# Patient Record
Sex: FEMALE | Race: WHITE | NOT HISPANIC OR LATINO | Employment: FULL TIME | URBAN - METROPOLITAN AREA
[De-identification: names, ages, dates, MRNs, and addresses within clinical notes are randomized per-mention and may not be internally consistent; named-entity substitution may affect disease eponyms.]

---

## 2023-03-27 RX ORDER — ROPINIROLE 1 MG/1
1 TABLET, FILM COATED ORAL NIGHTLY
COMMUNITY
Start: 2019-07-02 | End: 2023-06-20 | Stop reason: SDUPTHER

## 2023-03-27 RX ORDER — GLIMEPIRIDE 4 MG/1
1 TABLET ORAL DAILY
COMMUNITY
Start: 2018-12-27 | End: 2023-06-20 | Stop reason: SDUPTHER

## 2023-03-27 RX ORDER — LISINOPRIL 5 MG/1
1 TABLET ORAL DAILY
COMMUNITY
Start: 2017-11-10 | End: 2023-06-20 | Stop reason: SDUPTHER

## 2023-03-27 RX ORDER — ATORVASTATIN CALCIUM 20 MG/1
1 TABLET, FILM COATED ORAL DAILY
COMMUNITY
Start: 2017-11-10 | End: 2023-06-20 | Stop reason: SDUPTHER

## 2023-03-27 RX ORDER — LANOLIN ALCOHOL/MO/W.PET/CERES
CREAM (GRAM) TOPICAL
COMMUNITY

## 2023-03-27 RX ORDER — MELOXICAM 15 MG/1
1 TABLET ORAL DAILY
COMMUNITY
Start: 2022-10-03 | End: 2023-06-07

## 2023-03-27 RX ORDER — LEVOTHYROXINE SODIUM 100 UG/1
1 TABLET ORAL DAILY
COMMUNITY
Start: 2018-07-06 | End: 2023-06-20 | Stop reason: SDUPTHER

## 2023-03-27 RX ORDER — METFORMIN HYDROCHLORIDE 1000 MG/1
1 TABLET ORAL 2 TIMES DAILY
COMMUNITY
Start: 2018-07-06 | End: 2023-06-20 | Stop reason: SDUPTHER

## 2023-03-29 DIAGNOSIS — E11.69 TYPE 2 DIABETES MELLITUS WITH OTHER SPECIFIED COMPLICATION, UNSPECIFIED WHETHER LONG TERM INSULIN USE (MULTI): Primary | ICD-10-CM

## 2023-06-05 DIAGNOSIS — G56.01 ACUTE CARPAL TUNNEL SYNDROME, RIGHT: ICD-10-CM

## 2023-06-06 PROBLEM — G56.01 ACUTE CARPAL TUNNEL SYNDROME, RIGHT: Status: ACTIVE | Noted: 2023-06-06

## 2023-06-07 RX ORDER — MELOXICAM 15 MG/1
TABLET ORAL
Qty: 30 TABLET | Refills: 0 | Status: SHIPPED | OUTPATIENT
Start: 2023-06-07 | End: 2023-06-20 | Stop reason: SDUPTHER

## 2023-06-20 ENCOUNTER — TELEMEDICINE (OUTPATIENT)
Dept: PHARMACY | Facility: HOSPITAL | Age: 68
End: 2023-06-20
Payer: MEDICARE

## 2023-06-20 ENCOUNTER — OFFICE VISIT (OUTPATIENT)
Dept: PRIMARY CARE | Facility: CLINIC | Age: 68
End: 2023-06-20
Payer: MEDICARE

## 2023-06-20 VITALS
WEIGHT: 224 LBS | HEART RATE: 65 BPM | RESPIRATION RATE: 18 BRPM | DIASTOLIC BLOOD PRESSURE: 72 MMHG | SYSTOLIC BLOOD PRESSURE: 114 MMHG | HEIGHT: 69 IN | TEMPERATURE: 97 F | OXYGEN SATURATION: 96 % | BODY MASS INDEX: 33.18 KG/M2

## 2023-06-20 DIAGNOSIS — E55.9 VITAMIN D DEFICIENCY, UNSPECIFIED: ICD-10-CM

## 2023-06-20 DIAGNOSIS — I10 HYPERTENSION, UNSPECIFIED TYPE: ICD-10-CM

## 2023-06-20 DIAGNOSIS — G25.81 RESTLESS LEGS SYNDROME: Primary | ICD-10-CM

## 2023-06-20 DIAGNOSIS — R79.89 LOW VITAMIN B12 LEVEL: ICD-10-CM

## 2023-06-20 DIAGNOSIS — G56.01 ACUTE CARPAL TUNNEL SYNDROME, RIGHT: ICD-10-CM

## 2023-06-20 DIAGNOSIS — E03.9 HYPOTHYROIDISM, UNSPECIFIED TYPE: ICD-10-CM

## 2023-06-20 DIAGNOSIS — E11.69 TYPE 2 DIABETES MELLITUS WITH OTHER SPECIFIED COMPLICATION, UNSPECIFIED WHETHER LONG TERM INSULIN USE (MULTI): ICD-10-CM

## 2023-06-20 DIAGNOSIS — Z12.31 BREAST CANCER SCREENING BY MAMMOGRAM: ICD-10-CM

## 2023-06-20 DIAGNOSIS — M21.42 FALLEN ARCHES: ICD-10-CM

## 2023-06-20 DIAGNOSIS — M21.41 FALLEN ARCHES: ICD-10-CM

## 2023-06-20 PROBLEM — H90.3 BILATERAL SENSORINEURAL HEARING LOSS: Status: ACTIVE | Noted: 2023-06-20

## 2023-06-20 PROBLEM — D72.820 ATYPICAL LYMPHOCYTOSIS: Status: ACTIVE | Noted: 2023-06-20

## 2023-06-20 PROBLEM — H60.90 OTITIS EXTERNA: Status: ACTIVE | Noted: 2023-06-20

## 2023-06-20 PROBLEM — U07.1 DISEASE DUE TO SEVERE ACUTE RESPIRATORY SYNDROME CORONAVIRUS 2 (SARS-COV-2): Status: ACTIVE | Noted: 2023-06-20

## 2023-06-20 PROBLEM — M85.80 OSTEOPENIA: Status: ACTIVE | Noted: 2023-06-20

## 2023-06-20 PROBLEM — R10.9 RIGHT FLANK PAIN: Status: ACTIVE | Noted: 2023-06-20

## 2023-06-20 PROBLEM — J01.90 ACUTE BACTERIAL SINUSITIS: Status: ACTIVE | Noted: 2023-06-20

## 2023-06-20 PROBLEM — B96.89 ACUTE BACTERIAL SINUSITIS: Status: ACTIVE | Noted: 2023-06-20

## 2023-06-20 PROBLEM — R50.9 FUO (FEVER OF UNKNOWN ORIGIN): Status: ACTIVE | Noted: 2023-06-20

## 2023-06-20 PROBLEM — I20.89 ATYPICAL ANGINA (CMS-HCC): Status: ACTIVE | Noted: 2023-06-20

## 2023-06-20 PROBLEM — R94.31 ABNORMAL EKG: Status: ACTIVE | Noted: 2023-06-20

## 2023-06-20 PROBLEM — H60.311 DIFFUSE OTITIS EXTERNA OF RIGHT EAR: Status: ACTIVE | Noted: 2019-12-12

## 2023-06-20 PROBLEM — E11.9 DIABETES MELLITUS (MULTI): Status: ACTIVE | Noted: 2023-06-20

## 2023-06-20 PROBLEM — R53.83 FATIGUE: Status: ACTIVE | Noted: 2023-06-20

## 2023-06-20 PROBLEM — R50.9 FEVER AND CHILLS: Status: ACTIVE | Noted: 2023-06-20

## 2023-06-20 PROBLEM — H61.21 IMPACTED CERUMEN, RIGHT EAR: Status: ACTIVE | Noted: 2019-12-12

## 2023-06-20 PROBLEM — J02.9 SORE THROAT: Status: ACTIVE | Noted: 2023-06-20

## 2023-06-20 PROBLEM — D64.9 ANEMIA: Status: ACTIVE | Noted: 2023-06-20

## 2023-06-20 PROBLEM — E78.5 HYPERLIPIDEMIA: Status: ACTIVE | Noted: 2023-06-20

## 2023-06-20 PROBLEM — M25.551 HIP PAIN, RIGHT: Status: ACTIVE | Noted: 2023-06-20

## 2023-06-20 PROBLEM — M25.562 KNEE PAIN, LEFT: Status: ACTIVE | Noted: 2023-06-20

## 2023-06-20 PROBLEM — R71.8 ELEVATED MCV: Status: ACTIVE | Noted: 2023-06-20

## 2023-06-20 PROBLEM — F41.9 ANXIETY: Status: ACTIVE | Noted: 2023-06-20

## 2023-06-20 PROBLEM — E78.00 HYPERCHOLESTEROLEMIA: Status: ACTIVE | Noted: 2023-06-20

## 2023-06-20 PROBLEM — E66.9 OBESITY (BMI 30-39.9): Status: ACTIVE | Noted: 2023-06-20

## 2023-06-20 PROBLEM — R31.9 HEMATURIA: Status: ACTIVE | Noted: 2023-06-20

## 2023-06-20 PROBLEM — G47.30 SLEEP APNEA: Status: ACTIVE | Noted: 2023-06-20

## 2023-06-20 PROBLEM — G43.909 MIGRAINE HEADACHE: Status: ACTIVE | Noted: 2023-06-20

## 2023-06-20 PROBLEM — J30.9 ALLERGIC RHINITIS: Status: ACTIVE | Noted: 2023-06-20

## 2023-06-20 PROBLEM — R31.21 ASYMPTOMATIC MICROSCOPIC HEMATURIA: Status: ACTIVE | Noted: 2023-06-20

## 2023-06-20 LAB — POC HEMOGLOBIN A1C: 7.6 % (ref 4.2–6.5)

## 2023-06-20 PROCEDURE — 3074F SYST BP LT 130 MM HG: CPT | Performed by: FAMILY MEDICINE

## 2023-06-20 PROCEDURE — 1036F TOBACCO NON-USER: CPT | Performed by: FAMILY MEDICINE

## 2023-06-20 PROCEDURE — 3078F DIAST BP <80 MM HG: CPT | Performed by: FAMILY MEDICINE

## 2023-06-20 PROCEDURE — 83036 HEMOGLOBIN GLYCOSYLATED A1C: CPT | Performed by: FAMILY MEDICINE

## 2023-06-20 PROCEDURE — 99214 OFFICE O/P EST MOD 30 MIN: CPT | Performed by: FAMILY MEDICINE

## 2023-06-20 PROCEDURE — 1159F MED LIST DOCD IN RCRD: CPT | Performed by: FAMILY MEDICINE

## 2023-06-20 PROCEDURE — 1160F RVW MEDS BY RX/DR IN RCRD: CPT | Performed by: FAMILY MEDICINE

## 2023-06-20 PROCEDURE — 4010F ACE/ARB THERAPY RXD/TAKEN: CPT | Performed by: FAMILY MEDICINE

## 2023-06-20 RX ORDER — ATORVASTATIN CALCIUM 20 MG/1
20 TABLET, FILM COATED ORAL DAILY
Qty: 90 TABLET | Refills: 3 | Status: SHIPPED | OUTPATIENT
Start: 2023-06-20 | End: 2023-12-05 | Stop reason: SDUPTHER

## 2023-06-20 RX ORDER — SEMAGLUTIDE 1.34 MG/ML
0.25 INJECTION, SOLUTION SUBCUTANEOUS
Qty: 4.5 G | Refills: 11 | Status: SHIPPED | OUTPATIENT
Start: 2023-06-20 | End: 2023-08-28 | Stop reason: SDUPTHER

## 2023-06-20 RX ORDER — LISINOPRIL 5 MG/1
5 TABLET ORAL DAILY
Qty: 90 TABLET | Refills: 3 | Status: SHIPPED | OUTPATIENT
Start: 2023-06-20 | End: 2023-09-25 | Stop reason: SINTOL

## 2023-06-20 RX ORDER — MELOXICAM 15 MG/1
15 TABLET ORAL DAILY
Qty: 90 TABLET | Refills: 3 | Status: SHIPPED | OUTPATIENT
Start: 2023-06-20 | End: 2023-12-05 | Stop reason: SDUPTHER

## 2023-06-20 RX ORDER — METFORMIN HYDROCHLORIDE 1000 MG/1
1000 TABLET ORAL 2 TIMES DAILY
Qty: 180 TABLET | Refills: 3 | Status: SHIPPED | OUTPATIENT
Start: 2023-06-20 | End: 2023-12-05 | Stop reason: SDUPTHER

## 2023-06-20 RX ORDER — GLIMEPIRIDE 4 MG/1
4 TABLET ORAL DAILY
Qty: 90 TABLET | Refills: 3 | Status: SHIPPED | OUTPATIENT
Start: 2023-06-20 | End: 2023-09-25 | Stop reason: ALTCHOICE

## 2023-06-20 RX ORDER — LEVOTHYROXINE SODIUM 100 UG/1
100 TABLET ORAL DAILY
Qty: 90 TABLET | Refills: 3 | Status: SHIPPED | OUTPATIENT
Start: 2023-06-20 | End: 2023-12-05 | Stop reason: SDUPTHER

## 2023-06-20 RX ORDER — ROPINIROLE 1 MG/1
1 TABLET, FILM COATED ORAL NIGHTLY
Qty: 90 TABLET | Refills: 3 | Status: SHIPPED | OUTPATIENT
Start: 2023-06-20 | End: 2023-12-05 | Stop reason: SDUPTHER

## 2023-06-20 NOTE — PROGRESS NOTES
I reviewed the progress note and agree with the resident’s findings and plans as written. Case discussed with resident.    Emeka Sutherland, PharmD

## 2023-06-20 NOTE — PATIENT INSTRUCTIONS
Hello  Please start Ozempic 0.25mg once a week injection. Will follow up in 3 weeks for drug tolerability and dose increase.  Thank you  Lara Ordoñez, PharmD

## 2023-06-20 NOTE — ASSESSMENT & PLAN NOTE
Hemoglobin A1c with good control however will attempt to reduce at 7.6 lower with Ozempic and discussed diet see wrap-up

## 2023-06-20 NOTE — PROGRESS NOTES
Subjective   Patient ID: Daphney Murcia is a 67 y.o. female who presents for Follow-up.    HPI   Patient is here for a recheck of diabetes. Today  7.6  .  occasionally/often/rarely checks his blood sugars... Highest sugar was   200     low sugar..150   remains compliant on his medications.,,, Hypoglycemia has occurred rarely or never.... Symptoms do not include polydipsia, polyuria, blurred vision, yes...numbness and tingling in the toes, increased appetite, weight gain, weight loss, infections or foot problems... Last eye exam 2023   and ok     aspirin use  denies      last foot exam today and     pes planus foot deformity   no problematic toenails '  Patient is also here for follow-up of hypertension.. Symptoms do   include headache... no  confusion visual disturbance dizziness shortness of breath chest pain syncope or palpitations. Recent blood pressure patient has not been checking. By report there is good compliance with treatment. Pertinent medical history includes diabetes/hyperlipidemia   Patient is also here for follow-up of hyperlipidemia. The most recent measurements for this patient would take it on.. At that time.. Results of cholesterol numbers indicate cholesterol 120 HDL 35 LDL 48 triglycerides 182 dated 1/23 .... Associated symptoms do not include chest pain, Cramps with exertion, myalgias or nausea. Current treatment includes statins. By report there is good compliance with treatment. Pertinent medical history includes diabetes and hypertension   HERE FOR RECHK OF THYROIDLast clinic visit was month (S) ago. Symptoms do not include weight gain, cold intolerance, fatigue, weakness, appetite, hair loss, dry skin    There is no known event that preceded symptom onset.  . Current treatment includes levothyroxine. Report there is good compliance with medical treatment.   Review of Systems  cardiovascular:  no  palpitations or chest  pain  respiratory: no  shortness  of  breath  endocrine:  no  "polydipsia,  no polyuria  musculoskeletal:  no  myalgia.. yes  arthralgia  All other  systems discussed  negative   Objective   /72   Pulse 65   Temp 36.1 °C (97 °F)   Resp 18   Ht 1.753 m (5' 9\")   Wt 102 kg (224 lb)   SpO2 96%   BMI 33.08 kg/m²     Physical Exam  general: alert oriented x three  HEENT hearing normal to voice  Neck supple  Lungs respirations non-labored.  Cardiovascular: no peripheral edema  Skin: warm and dry without rash  Psych: judgement and insight normal  Musculoskeletal:  ambulation normal,    lymph:negative cervical  LYMPADENOPATHY  thyroid: non palpable enlargement   no more review of CAT scan showed mild hepatic steatosis along with colonic diverticulosis and mammogram dated 12/22 within normal limits  Assessment/Plan   Problem List Items Addressed This Visit       Acute carpal tunnel syndrome, right    Relevant Medications    meloxicam (Mobic) 15 mg tablet    Restless legs syndrome - Primary     Stable with treatment and continue medication i.e. ropinirole         Relevant Medications    rOPINIRole (Requip) 1 mg tablet    Low vitamin B12 level     History of low B12 and encouraged to take B12 2500 IUs daily indefinitely         Relevant Orders    CBC and Auto Differential    Vitamin B12    Hypothyroidism     Stable with current treatment and thyroid testing reviewed with TSH at 1.5 and acceptable continue meds         Relevant Medications    levothyroxine (Synthroid, Levoxyl) 100 mcg tablet    Hypertension     Blood pressure stable with continued use of meds and encouraged to check periodically         Relevant Medications    lisinopril 5 mg tablet    Fallen arches     In light of foot pain will refer to podiatry and evaluation for toe deformity         Diabetes mellitus (CMS/HCC)     Hemoglobin A1c with good control however will attempt to reduce at 7.6 lower with Ozempic and discussed diet see wrap-up         Relevant Medications    atorvastatin (Lipitor) 20 mg tablet    " blood sugar diagnostic strip    glimepiride (Amaryl) 4 mg tablet    metFORMIN (Glucophage) 1,000 mg tablet    semaglutide (Ozempic) 0.25 mg or 0.5 mg(2 mg/1.5 mL) pen injector    Other Relevant Orders    POCT glycosylated hemoglobin (Hb A1C) manually resulted    Follow Up In [facility] - Pharmacy    Comprehensive Metabolic Panel    Vitamin D, Total    Lipid Panel    Albumin , Urine Random    Referral to Podiatry     Other Visit Diagnoses       Breast cancer screening by mammogram        Relevant Orders    BI mammo bilateral screening tomosynthesis    Vitamin D deficiency, unspecified        Relevant Orders    Vitamin D, Total

## 2023-06-20 NOTE — PROGRESS NOTES
Subjective   Patient ID: Daphney Murcia is a 67 y.o. female who presents for Ozempic titration    Referring Provider: DO NALLELY Leblanc    Review of Systems    Objective     There were no vitals taken for this visit.     Labs  Lab Results   Component Value Date    BILITOT 0.4 01/07/2023    CALCIUM 9.2 01/07/2023    CO2 27 01/07/2023     01/07/2023    CREATININE 0.82 01/07/2023    GLUCOSE 136 (H) 01/07/2023    ALKPHOS 65 01/07/2023    K 4.4 01/07/2023    PROT 7.2 01/07/2023     01/07/2023    AST 21 01/07/2023    ALT 19 01/07/2023    BUN 13 01/07/2023    ANIONGAP 11 01/07/2023    PHOS 3.6 12/03/2022    ALBUMIN 4.0 01/07/2023    GFRF 78 01/07/2023     Lab Results   Component Value Date    TRIG 182 (H) 01/07/2023    CHOL 120 01/07/2023    HDL 35.4 (A) 01/07/2023     Lab Results   Component Value Date    HGBA1C 7.6 (A) 06/20/2023       Assessment/Plan       Discussed new Ozempic prescription. Went over injection technique, side effects, storage and disposal. Will follow up in 3 weeks.    Plan:  Start Ozempic 0.25mg once a week for 4 weeks  Will follow up in 3 weeks to discuss tolerability    Continue all meds under the continuation of care with the referring provider and clinical pharmacy team.     Lara Ordoñez, PharmD

## 2023-06-20 NOTE — ASSESSMENT & PLAN NOTE
Stable with current treatment and thyroid testing reviewed with TSH at 1.5 and acceptable continue meds

## 2023-06-20 NOTE — PATIENT INSTRUCTIONS

## 2023-07-12 ENCOUNTER — TELEMEDICINE (OUTPATIENT)
Dept: PHARMACY | Facility: HOSPITAL | Age: 68
End: 2023-07-12
Payer: MEDICARE

## 2023-07-12 DIAGNOSIS — E11.69 TYPE 2 DIABETES MELLITUS WITH OTHER SPECIFIED COMPLICATION, UNSPECIFIED WHETHER LONG TERM INSULIN USE (MULTI): ICD-10-CM

## 2023-07-12 NOTE — PROGRESS NOTES
"Subjective   Patient ID: Daphney Murcia is a 67 y.o. female who presents for Diabetes (Ozempic follow-up). Reports minor a/e: including.constipation, and lack of motivation to due tasks lasting up to 2 days after injection. Has taken 3 dosed of 0.25 mg Ozempic and is ready to uptitrate to 0.5 mg after her 4th injection. Has refills available to complete 4 week 0.5 mg/week dose. Had two blood sugars to report today 97 (fasting) and 147 mg/dL (unknown if fasting)      There were no vitals taken for this visit.        Assessment/Plan   Problem List Items Addressed This Visit       Diabetes mellitus (CMS/[facility])    Relevant Orders    Follow Up In [facility] - Pharmacy       LAB RESULTS:  Lab Results   Component Value Date    HGBA1C 7.6 (A) 06/20/2023     Lab Results   Component Value Date    CREATININE 0.82 01/07/2023      Lab Results   Component Value Date    CHOL 120 01/07/2023    CHOL 139 06/25/2022    CHOL 122 06/12/2021     Lab Results   Component Value Date    HDL 35.4 (A) 01/07/2023    HDL 38.0 (A) 06/25/2022    HDL 35.0 (A) 06/12/2021       No results found for: \"LDLCALC\"  Lab Results   Component Value Date    TRIG 182 (H) 01/07/2023    TRIG 243 (H) 06/25/2022    TRIG 186 (H) 06/12/2021     No components found for: \"CHOLHDL\"     === 02/12/21 ===    - Impression -  The BMD measured at Femur Neck Right is 0.752 g/cm2 with a T-score of  -2.1.  This patient is considered to have low bone mass according to  World Health Organization (WHO) criteria.  Bone density is between 10  and 25% below young normal.  Treatment is advised.    With a Z-score of -1.3, this patient's BMD is low for someone of this  age.  .  Bone mineral density in the lumbar spine may be falsely elevated  secondary to discogenic degenerative disease and degenerative facet  sclerosis.    Follow-up DEXA and treatment is recommended as clinically indicated.  .  All images and detailed analysis are available on the  Radiology  PACS.*    *For " patients with comparison exams obtained from Hologic DEXA prior  to December 2006, measurements presented in this report have been  modified to reflect more accurate trend analysis when compared to  current data obtained on the [facility] DEXA.    Patient: VICENTE WEBSTER TARAS Referring Physician: LESLEY LI  [id number]  YOB: 1955 [age] Patient ID: [id number]    Height:     71.0 in.   Weight:    225.0 lbs. Measured:   02/12/2021  10:00:23 AM (16  [SP 2])  Sex:        Female     Ethnicity: White      Analyzed:   02/12/2021  10:03:44 AM (16  [SP 2])    AP Spine Bone Density          Densitometry: [place] (Combined [email or web address])      Region BMD     YA      AM  (g/cm2) T-score Z-score  L1    1.047 -0.8 -0.3  L2    1.047 -1.3 -0.9  L3    0.969 -2.0 -1.5  L4    0.999 -1.7 -1.3  L1-L4 1.011 -1.5 -1.0    Patient: VICENTE WEBSTER Referring Physician: LESLEY LI  [id number]  YOB: 1955 [age] Patient ID: [id number]    Height:     71.0 in.   Weight:    225.0 lbs. Measured:   02/12/2021  10:00:23 AM (16  [SP 2])  Sex:        Female     Ethnicity: White      Analyzed:   02/12/2021  10:03:44 AM (16  [SP 2])    ANCILLARY RESULTS: AP Spine    Region BMD     YA  YA      AM  AM      BMC   Area  Width Height  (g/cm2) (%) T-score (%) Z-score (g)   (cm2) (cm)  (cm)  L1     1.047   92  -0.8    96  -0.3    11.14 10.64 3.9   2.72  L2     1.047   87  -1.3    90  -0.9    12.91 12.34 3.6   3.45  L3     0.969   80  -2.0    84  -1.5    15.23 15.72 4.0   3.93  L4     0.999   83  -1.7    87  -1.3    13.79 13.80 4.5   3.05  L1-L2  1.047   89  -1.1    93  -0.6    24.06 22.98 3.7   6.17  L1-L3  1.015   86  -1.4    90  -0.9    39.28 38.70 3.8   10.11  L1-L4  1.011   85  -1.5    89  -1.0    53.07 52.50 4.0   13.16  L2-L3  1.003   83  -1.7    87  -1.3    28.14 28.06 3.8   7.39  L2-L4  1.002   83  -1.7    87  -1.3    41.93 41.86 4.0   10.44  L3-L4  0.983   81  -1.9    85  -1.4    29.01 29.52 4.3    6.98      Patient: VICENTE WEBSTER Referring Physician: LESLEY LI  [id number]  YOB: 1955 [age] Patient ID: [id number]    Height:     71.0 in.   Weight:    225.0 lbs. Measured:   02/12/2021  10:00:23 AM (16  [SP 2])  Sex:        Female     Ethnicity: White      Analyzed:   02/12/2021  10:03:44 AM (16  [SP 2])    DualFemur Bone Density      Densitometry: [place] (Combined DBVu/Lunar)  Region  BMD  (g/cm2)  YA  T-score  AM  Z-score  Neck Left  0.792  -1.8  -1.0  Neck Right  0.752  -2.1  -1.3  Neck Mean  0.772  -1.9  -1.2  Neck Diff.  0.040  0.3  0.3  Total Left  0.898  -0.9  -0.5  Total Right  0.961  -0.4  0.1  Total Mean  0.930  -0.6  -0.2  Total Diff.  0.064  0.5  0.5    Right=125.5 mm  Auen=635.9 mm  Left=111.8 mm    Densitometry: [place] (Combined NHANES/Lunar)    Region BMD     YA      AM  (g/cm2) T-score Z-score  Neck Left   0.792 -1.8 -1.0  Neck Right  0.752 -2.1 -1.3  Neck Mean   0.772 -1.9 -1.2  Neck Diff.  0.040 0.3  0.3  Total Left  0.898 -0.9 -0.5  Total Right 0.961 -0.4 0.1  Total Mean  0.930 -0.6 -0.2  Total Diff. 0.064 0.5  0.5  Patient: VICENTE WEBSTER Referring Physician: LESLEY LI  [id number]  YOB: 1955 [age] Patient ID: [id number]    Height:     71.0 in.   Weight:    225.0 lbs. Measured:   02/12/2021  10:00:23 AM (16  [SP 2])  Sex:        Female     Ethnicity: White      Analyzed:   02/12/2021  10:03:44 AM (16  [SP 2])    ANCILLARY RESULTS: DualFemur    Region           BMD     YA  YA      AM  AM      BMC   Area  (g/cm2) (%) T-score (%) Z-score (g)   (cm2)  Neck Left        0.792   76  -1.8    85  -1.0    3.74  4.73  Neck Right       0.752   72  -2.1    81  -1.3    3.93  5.22  Neck Mean        0.772   74  -1.9    83  -1.2    3.84  4.98  Neck Diff.       0.040   4   0.3     4   0.3     0.19  0.50  Upper Neck Left  0.637   77  -1.5    85  -1.0    1.48  2.33  Upper Neck Right 0.524   64  -2.5    70  -1.9    1.30  2.49  Upper Neck Mean  0.580   71   -2.0    77  -1.4    1.39  2.41  Upper Neck Diff. 0.113   14  0.9     15  0.9     0.18  0.16  Lower Neck Left  0.943   -   -       -   -       2.26  2.40  Lower Neck Right 0.960   -   -       -   -       2.63  2.74  Lower Neck Mean  0.951   -   -       -   -       2.44  2.57  Lower Neck Diff. 0.017   -   -       -   -       0.36  0.34  Wards Left       0.506   56  -3.1    67  -1.9    1.26  2.48  Wards Right      0.497   55  -3.2    66  -1.9    1.51  3.03  Wards Mean       0.501   55  -3.1    67  -1.9    1.38  2.76  Wards Diff.      0.009   1   0.1     1   0.1     0.25  0.55  Troch Left       0.779   92  -0.6    95  -0.3    12.56 16.13  Troch Right      0.836   98  -0.1    102 0.2     14.59 17.46  Troch Mean       0.807   95  -0.4    99  -0.1    13.58 16.80  Troch Diff.      0.057   7   0.5     7   0.5     2.03  1.33  Shaft Left       1.070   -   -       -   -       15.07 14.09  Shaft Right      1.196   -   -       -   -       16.76 14.01  Shaft Mean       1.133   -   -       -   -       15.92 14.05  Shaft Diff.      0.126   -   -       -   -       1.69  0.07  Total Left       0.898   89  -0.9    94  -0.5    31.38 34.95  Total Right      0.961   95  -0.4    101 0.1     35.28 36.70  Total Mean       0.930   92  -0.6    97  -0.2    33.33 35.82  Total Diff.      0.064   6   0.5     7   0.5     3.91  1.75      Patient: WEBSTER VICENTE K Referring Physician: LESLEY LI  [id number]  YOB: 1955 [age] Patient ID: [id number]    Height:     71.0 in.   Weight:    225.0 lbs. Measured:   02/12/2021  10:00:23 AM (16  [SP 2])  Sex:        Female     Ethnicity: White      Analyzed:   02/12/2021  10:03:44 AM (16  [SP 2])    FRAX* 10-year Probability of Fracture  Based on femoral neck BMD: DualFemur (Right)          Major Osteoporotic Fracture: 10.9%  Hip Fracture:                1.8%  Population:                  [place] ()  Risk Factors:                None        *FRAX is a trademark of the [place]  [facility]'s  [place] [facility] Metabolic Bone Disease, a World Health Organization (WHO)  Collaborating [place].  Dear LESLEY LI [id number],    Your patient VICENTE WEBSTER completed a FRAX assessment on  02/12/2021 using the Lunar [employer] Advance DXA System (analysis  version: 16  [SP 2]) manufactured by [employer].  The following  summarizes the results of our evaluation.    PATIENT BIOGRAPHICAL:  Name: VICENTE WEBSTER  Patient ID: [id number] YOB: 1955 Height:    71.0 in.  Gender:     Female   Age:        [age]       Weight:    225.0 lbs.  Ethnicity:  White                           Exam Date: 02/12/2021    FRAX* RESULTS:  (version: 3.8)    10-year Probability of Fracture1  Major Osteoporotic Fracture2 Hip Fracture  10.9%                        1.8%  Population:                  [place] ()  Risk Factors:                None    Based on DualFemur (Right) Neck BMD        1 -The 10-year probability of fracture may be lower than reported if  the patient has received treatment.    2 -Major Osteoporotic Fracture: Clinical Spine, Forearm, Hip or  Shoulder        *FRAX is a trademark of the [employer]'s  [place] [employer] Metabolic Bone Disease, a World Health Organization (WHO)  Collaborating [place].    ASSESSMENT:  The probability of a major osteoporotic fracture is 10.9 within the  next ten years.    The probability of a hip fracture is 1.8 within the next ten years.    RECOMMENDATIONS:  PLEASE ENTER RECOMMENDATION TEXT.    FOLLOW-UP:  PLEASE ENTER FOLLOW-UP TEXT.    Based on these results, a follow-up exam is recommended in  DOUBLE-CLICK FOR RECALL DATE.    Sincerely,    [id number]      Patient Name: VICENTE WEBSTER  Height: 71.0 in.  Weight: 225.0 lbs.  Fractures:  Treatments:  Clinical Data:  CLINICAL INFORMATION:Evaluate for osteopenia/osteoporosis  FINDINGS: Standard measurements were obtained utilizing a [employer]  X-ray Absorptiometry bone densitometer. Data  "obtained includes planar  bone density measurements over the DualFemur and Lumbar Spine.  Comparison of measured data and standardized mean data for a young  adult population (when peak bone mass occurs) results in a T score.  This represents the number of standard deviations above or below the  mean of a young adult population. Comparison of measured data to  standards from an age adjusted population similarly yields a Z score.    DualFemur Neck Mean :  . Bone Density: 0.772 g/cm2 . . T Score: -1.9 . . Z Score: -1.2 . .  WHO Classification: Osteopenia . . % Change: baseline *    DualFemur Total Mean :  . Bone Density: 0.930 g/cm2 . . T Score: -0.6 . . Z Score: -0.2 . .  WHO Classification: Normal . . % Change: baseline *    AP Spine L1-L4 :  . Bone Density: 1.011 g/cm2 . . T Score: -1.5 . . Z Score: -1.0 . .  WHO Classification: Osteopenia . . % Change: baseline *  .  World Health Organization (WHO) criteria defines normal bone density  as that which is less than 1 standard deviation (S.D.) below the mean  of a young adult population. Osteopenia is defined as a measured bone  density that is between 1 and 2.5 S.D. below the mean of a young  adult population. Osteoporosis is defined as a measured bone density  that is greater than or equal to 2.5 S.D. below the mean of a young  adult population. The WHO reference diagnosis is based on  postmenopausal women and men age 50 and over.  .  [Impression TRUNCATED]     No results found for: \"[id number]\"    Continue all meds under the continuation of care with the referring provider and clinical pharmacy team.     Send PAP paperwork to pt for Ozempic to see if she qualifies for a lower copay; follow-up in 2 weeks (7/24@9am)    "

## 2023-07-24 ENCOUNTER — TELEMEDICINE (OUTPATIENT)
Dept: PHARMACY | Facility: HOSPITAL | Age: 68
End: 2023-07-24
Payer: MEDICARE

## 2023-07-24 DIAGNOSIS — E11.69 TYPE 2 DIABETES MELLITUS WITH OTHER SPECIFIED COMPLICATION, UNSPECIFIED WHETHER LONG TERM INSULIN USE (MULTI): ICD-10-CM

## 2023-07-24 NOTE — PATIENT INSTRUCTIONS
Ozempic Education:     - Counseled patient on Ozempic MOA, expectations, side effects, duration of therapy, administration, and monitoring parameters.  - Provided detailed dosing and administration counseling to ensure proper technique.   - Reviewed Ozempic titration schedule, starting with 0.25 mg once weekly for 4 weeks, then continuing on 0.5 mg once weekly. Pt verbalized understanding.  - Counseled patient on the benefits of GLP-1ra, such as cardiovascular risk reduction, glycemic control, and weight loss potential.  - Reviewed storage requirements of Ozempic when not in use, and when to administer the medication if a dose is missed.  - Advised patient that they may experience improved satiety after meals and portion sizes of meals may be reduced as doses of Ozempic increase.  - Counseled patient to avoid foods that are fatty/oily as this may precipitate the nausea/GI upset that may occur with new start Ozempic.

## 2023-07-24 NOTE — PROGRESS NOTES
"Subjective   Patient ID: Daphney Murcia is a 67 y.o. female who presents for Diabetes (Follow-up per PCP). BG has been averaging int he 120's with a low of 80 (this morning) and high of 143 over the past week. Trying to test BID but there are days  that she forgets about it; usually gets once a day testing daily.    Currently taking Metformin 1 BID, Amaryl 4 mg daily and Ozempic 0.5 mg weekly; tolerating all well and no adverse effects to report. Does estimate about 5 lbs weight loss which she believes is from her Appetite decreasing since starting Ozempic.        Assessment/Plan   Problem List Items Addressed This Visit       Diabetes mellitus (CMS/[facility])       LAB RESULTS:  Lab Results   Component Value Date    HGBA1C 7.6 (A) 06/20/2023     Lab Results   Component Value Date    CREATININE 0.82 01/07/2023      Lab Results   Component Value Date    CHOL 120 01/07/2023    CHOL 139 06/25/2022    CHOL 122 06/12/2021     Lab Results   Component Value Date    HDL 35.4 (A) 01/07/2023    HDL 38.0 (A) 06/25/2022    HDL 35.0 (A) 06/12/2021       No results found for: \"LDLCALC\"  Lab Results   Component Value Date    TRIG 182 (H) 01/07/2023    TRIG 243 (H) 06/25/2022    TRIG 186 (H) 06/12/2021     No components found for: \"CHOLHDL\"     === 02/12/21 ===    - Impression -  The BMD measured at Femur Neck Right is 0.752 g/cm2 with a T-score of  -2.1.  This patient is considered to have low bone mass according to  World Health Organization (WHO) criteria.  Bone density is between 10  and 25% below young normal.  Treatment is advised.    With a Z-score of -1.3, this patient's BMD is low for someone of this  age.  .  Bone mineral density in the lumbar spine may be falsely elevated  secondary to discogenic degenerative disease and degenerative facet  sclerosis.    Follow-up DEXA and treatment is recommended as clinically indicated.  .  All images and detailed analysis are available on the  Radiology  PACS.*    *For patients with " comparison exams obtained from Hologic DEXA prior  to December 2006, measurements presented in this report have been  modified to reflect more accurate trend analysis when compared to  current data obtained on the Pix[employer] DEXA.    Patient: VICENTE WEBSTER TARAS Referring Physician: LESLEY LI  [id number]  YOB: 1955 [age] Patient ID: [id number]    Height:     71.0 in.   Weight:    225.0 lbs. Measured:   02/12/2021  10:00:23 AM (16  [SP 2])  Sex:        Female     Ethnicity: White      Analyzed:   02/12/2021  10:03:44 AM (16  [SP 2])    AP Spine Bone Density          Densitometry: [place] (Combined [facility])      Region BMD     YA      AM  (g/cm2) T-score Z-score  L1    1.047 -0.8 -0.3  L2    1.047 -1.3 -0.9  L3    0.969 -2.0 -1.5  L4    0.999 -1.7 -1.3  L1-L4 1.011 -1.5 -1.0    Patient: DARIN VICENTE K Referring Physician: LESLEY LI  [id number]  YOB: 1955 [age] Patient ID: [id number]    Height:     71.0 in.   Weight:    225.0 lbs. Measured:   02/12/2021  10:00:23 AM (16  [SP 2])  Sex:        Female     Ethnicity: White      Analyzed:   02/12/2021  10:03:44 AM (16  [SP 2])    ANCILLARY RESULTS: AP Spine    Region BMD     YA  YA      AM  AM      BMC   Area  Width Height  (g/cm2) (%) T-score (%) Z-score (g)   (cm2) (cm)  (cm)  L1     1.047   92  -0.8    96  -0.3    11.14 10.64 3.9   2.72  L2     1.047   87  -1.3    90  -0.9    12.91 12.34 3.6   3.45  L3     0.969   80  -2.0    84  -1.5    15.23 15.72 4.0   3.93  L4     0.999   83  -1.7    87  -1.3    13.79 13.80 4.5   3.05  L1-L2  1.047   89  -1.1    93  -0.6    24.06 22.98 3.7   6.17  L1-L3  1.015   86  -1.4    90  -0.9    39.28 38.70 3.8   10.11  L1-L4  1.011   85  -1.5    89  -1.0    53.07 52.50 4.0   13.16  L2-L3  1.003   83  -1.7    87  -1.3    28.14 28.06 3.8   7.39  L2-L4  1.002   83  -1.7    87  -1.3    41.93 41.86 4.0   10.44  L3-L4  0.983   81  -1.9    85  -1.4    29.01 29.52 4.3   6.98      Patient:  VICENTE WEBSTER Referring Physician: LESLEY LI  [id number]  YOB: 1955 [age] Patient ID: [id number]    Height:     71.0 in.   Weight:    225.0 lbs. Measured:   02/12/2021  10:00:23 AM (16  [SP 2])  Sex:        Female     Ethnicity: White      Analyzed:   02/12/2021  10:03:44 AM (16  [SP 2])    DualFemur Bone Density      Densitometry: [place] (Combined [employer])  Region  BMD  (g/cm2)  YA  T-score  AM  Z-score  Neck Left  0.792  -1.8  -1.0  Neck Right  0.752  -2.1  -1.3  Neck Mean  0.772  -1.9  -1.2  Neck Diff.  0.040  0.3  0.3  Total Left  0.898  -0.9  -0.5  Total Right  0.961  -0.4  0.1  Total Mean  0.930  -0.6  -0.2  Total Diff.  0.064  0.5  0.5    Right=125.5 mm  Ajjm=442.9 mm  Left=111.8 mm    Densitometry: [place] (Combined [facility]/Lunar)    Region BMD     YA      AM  (g/cm2) T-score Z-score  Neck Left   0.792 -1.8 -1.0  Neck Right  0.752 -2.1 -1.3  Neck Mean   0.772 -1.9 -1.2  Neck Diff.  0.040 0.3  0.3  Total Left  0.898 -0.9 -0.5  Total Right 0.961 -0.4 0.1  Total Mean  0.930 -0.6 -0.2  Total Diff. 0.064 0.5  0.5  Patient: VICENTE WEBSTER Referring Physician: LESLEY LI  [id number]  YOB: 1955 [age] Patient ID: [id number]    Height:     71.0 in.   Weight:    225.0 lbs. Measured:   02/12/2021  10:00:23 AM (16  [SP 2])  Sex:        Female     Ethnicity: White      Analyzed:   02/12/2021  10:03:44 AM (16  [SP 2])    ANCILLARY RESULTS: DualFemur    Region           BMD     YA  YA      AM  AM      BMC   Area  (g/cm2) (%) T-score (%) Z-score (g)   (cm2)  Neck Left        0.792   76  -1.8    85  -1.0    3.74  4.73  Neck Right       0.752   72  -2.1    81  -1.3    3.93  5.22  Neck Mean        0.772   74  -1.9    83  -1.2    3.84  4.98  Neck Diff.       0.040   4   0.3     4   0.3     0.19  0.50  Upper Neck Left  0.637   77  -1.5    85  -1.0    1.48  2.33  Upper Neck Right 0.524   64  -2.5    70  -1.9    1.30  2.49  Upper Neck Mean  0.580   71  -2.0    77  -1.4     1.39  2.41  Upper Neck Diff. 0.113   14  0.9     15  0.9     0.18  0.16  Lower Neck Left  0.943   -   -       -   -       2.26  2.40  Lower Neck Right 0.960   -   -       -   -       2.63  2.74  Lower Neck Mean  0.951   -   -       -   -       2.44  2.57  Lower Neck Diff. 0.017   -   -       -   -       0.36  0.34  Wards Left       0.506   56  -3.1    67  -1.9    1.26  2.48  Wards Right      0.497   55  -3.2    66  -1.9    1.51  3.03  Wards Mean       0.501   55  -3.1    67  -1.9    1.38  2.76  Wards Diff.      0.009   1   0.1     1   0.1     0.25  0.55  Troch Left       0.779   92  -0.6    95  -0.3    12.56 16.13  Troch Right      0.836   98  -0.1    102 0.2     14.59 17.46  Troch Mean       0.807   95  -0.4    99  -0.1    13.58 16.80  Troch Diff.      0.057   7   0.5     7   0.5     2.03  1.33  Shaft Left       1.070   -   -       -   -       15.07 14.09  Shaft Right      1.196   -   -       -   -       16.76 14.01  Shaft Mean       1.133   -   -       -   -       15.92 14.05  Shaft Diff.      0.126   -   -       -   -       1.69  0.07  Total Left       0.898   89  -0.9    94  -0.5    31.38 34.95  Total Right      0.961   95  -0.4    101 0.1     35.28 36.70  Total Mean       0.930   92  -0.6    97  -0.2    33.33 35.82  Total Diff.      0.064   6   0.5     7   0.5     3.91  1.75      Patient: VICENTE WEBSTER Referring Physician: LESLEY LI  [id number]  YOB: 1955 [age] Patient ID: [id number]    Height:     71.0 in.   Weight:    225.0 lbs. Measured:   02/12/2021  10:00:23 AM (16  [SP 2])  Sex:        Female     Ethnicity: White      Analyzed:   02/12/2021  10:03:44 AM (16  [SP 2])    FRAX* 10-year Probability of Fracture  Based on femoral neck BMD: DualFemur (Right)          Major Osteoporotic Fracture: 10.9%  Hip Fracture:                1.8%  Population:                  [place] ()  Risk Factors:                None        *FRAX is a trademark of the [employer]  [place] for Metabolic Bone Disease, a World Health Organization (WHO)  Collaborating [place].  Dear LESLEY LI [id number],    Your patient VICENTE WEBSTER completed a FRAX assessment on  02/12/2021 using the Lunar Prodigy Advance DXA System (analysis  version: 16  [SP 2]) manufactured by [employer].  The following  summarizes the results of our evaluation.    PATIENT BIOGRAPHICAL:  Name: VICENTE WEBSTER  Patient ID: [id number] YOB: 1955 Height:    71.0 in.  Gender:     Female   Age:        [age]       Weight:    225.0 lbs.  Ethnicity:  White                           Exam Date: 02/12/2021    FRAX* RESULTS:  (version: 3.8)    10-year Probability of Fracture1  Major Osteoporotic Fracture2 Hip Fracture  10.9%                        1.8%  Population:                  [place] ()  Risk Factors:                None    Based on DualFemur (Right) Neck BMD        1 -The 10-year probability of fracture may be lower than reported if  the patient has received treatment.    2 -Major Osteoporotic Fracture: Clinical Spine, Forearm, Hip or  Shoulder        *FRAX is a trademark of the [employer]  [place] for Metabolic Bone Disease, a World Health Organization (WHO)  Collaborating [place].    ASSESSMENT:  The probability of a major osteoporotic fracture is 10.9 within the  next ten years.    The probability of a hip fracture is 1.8 within the next ten years.    RECOMMENDATIONS:  PLEASE ENTER RECOMMENDATION TEXT.    FOLLOW-UP:  PLEASE ENTER FOLLOW-UP TEXT.    Based on these results, a follow-up exam is recommended in  DOUBLE-CLICK FOR RECALL DATE.    Sincerely,    [id number]      Patient Name: VICENTE WEBSTER  Height: 71.0 in.  Weight: 225.0 lbs.  Fractures:  Treatments:  Clinical Data:  CLINICAL INFORMATION:Evaluate for osteopenia/osteoporosis  FINDINGS: Standard measurements were obtained utilizing a [employer] Energy  X-ray Absorptiometry bone densitometer. Data obtained includes  planar  bone density measurements over the DualFemur and Lumbar Spine.  Comparison of measured data and standardized mean data for a young  adult population (when peak bone mass occurs) results in a T score.  This represents the number of standard deviations above or below the  mean of a young adult population. Comparison of measured data to  standards from an age adjusted population similarly yields a Z score.    DualFemur Neck Mean :  . Bone Density: 0.772 g/cm2 . . T Score: -1.9 . . Z Score: -1.2 . .  WHO Classification: Osteopenia . . % Change: baseline *    DualFemur Total Mean :  . Bone Density: 0.930 g/cm2 . . T Score: -0.6 . . Z Score: -0.2 . .  WHO Classification: Normal . . % Change: baseline *    AP Spine L1-L4 :  . Bone Density: 1.011 g/cm2 . . T Score: -1.5 . . Z Score: -1.0 . .  WHO Classification: Osteopenia . . % Change: baseline *  .  World Health Organization (WHO) criteria defines normal bone density  as that which is less than 1 standard deviation (S.D.) below the mean  of a young adult population. Osteopenia is defined as a measured bone  density that is between 1 and 2.5 S.D. below the mean of a young  adult population. Osteoporosis is defined as a measured bone density  that is greater than or equal to 2.5 S.D. below the mean of a young  adult population. The WHO reference diagnosis is based on  postmenopausal women and men age 50 and over.  .        Continue all meds under the continuation of care with the referring provider and clinical pharmacy team.     Follow-up in 4 weeks to review progress; if uptitration consideration during next visit, will look into d/c'in Amaryl

## 2023-08-23 ENCOUNTER — TELEMEDICINE (OUTPATIENT)
Dept: PRIMARY CARE | Facility: CLINIC | Age: 68
End: 2023-08-23
Payer: MEDICARE

## 2023-08-23 DIAGNOSIS — J01.90 ACUTE BACTERIAL SINUSITIS: ICD-10-CM

## 2023-08-23 DIAGNOSIS — U07.1 COVID-19 VIRUS INFECTION: ICD-10-CM

## 2023-08-23 DIAGNOSIS — U07.1 COVID: Primary | ICD-10-CM

## 2023-08-23 DIAGNOSIS — B96.89 ACUTE BACTERIAL SINUSITIS: ICD-10-CM

## 2023-08-23 PROCEDURE — 99213 OFFICE O/P EST LOW 20 MIN: CPT | Performed by: FAMILY MEDICINE

## 2023-08-23 RX ORDER — BENZONATATE 100 MG/1
100 CAPSULE ORAL 3 TIMES DAILY PRN
Qty: 40 CAPSULE | Refills: 1 | Status: SHIPPED | OUTPATIENT
Start: 2023-08-23 | End: 2023-08-30

## 2023-08-23 RX ORDER — ALBUTEROL SULFATE 90 UG/1
2 AEROSOL, METERED RESPIRATORY (INHALATION) EVERY 6 HOURS PRN
Qty: 18 G | Refills: 0 | Status: SHIPPED | OUTPATIENT
Start: 2023-08-23 | End: 2023-12-05 | Stop reason: ALTCHOICE

## 2023-08-23 RX ORDER — AZITHROMYCIN 250 MG/1
TABLET, FILM COATED ORAL
Qty: 6 TABLET | Refills: 0 | Status: SHIPPED | OUTPATIENT
Start: 2023-08-23 | End: 2023-08-27

## 2023-08-23 ASSESSMENT — ENCOUNTER SYMPTOMS
SORE THROAT: 0
ABDOMINAL PAIN: 0
VOMITING: 0
NAUSEA: 0
COUGH: 1
DIARRHEA: 0
DYSURIA: 0
FEVER: 1
WHEEZING: 0
HEADACHES: 1

## 2023-08-23 NOTE — ASSESSMENT & PLAN NOTE
Fully vaccinated patients (boosted or completed primary series of Pfizer or Moderna vaccine within 6 months were completed primary series of J&J within the past 2 months (wearing mask around others for 10 days    Test on day 5 if possible    If symptoms develop to stay home and follow isolation requirements below    Not vaccinated or are more than 6 months out from second dose of Pfizer or Moderna and 9 boosted, or completed the primary J&J over 2 months ago and not boosted    Remain home for 5 days    Continue to wear a mask around other for additional 5 days    If unable to quarantine and wear a well fitting mask for 10 days    Test on day 5 if possible    If symptoms develop get tested, stay on, and follow isolation requirements below    Isolation regardless of vaccination status patients with known COVID 19    Asymptomatic    Remain at home for 5 days after first positive test (day 0 being the date that her specimen was collected for the positive test)    Continue to wear a well fitting mask for an additional 5 days beyond the 5-day isolation.    If symptoms develop after a positive test, the 5-day isolation she is started over (day 0 changes to the first day of symptoms)    Mild illness remain home for 5 days isolation can and with the following; at least 24 hours resolution of fever without using fever reducing medications and there is improvement of symptoms    For a well fitting mask for an additional 5 days    Moderate illness isolate for 10 days    Isolation can end with the following: At least 24 hours resolution of fever without using fever reducing medications and wear a well fitting mask for additional 5 days    Severe illness isolate for 10 to 20 days depending on severity isolation can end with the following; at least 10 and up to 20 days have passed since onset of symptoms and at least 24 hours resolution of fever without using fever reducing medications and there is improvement of symptoms    For  patients were severely ill or severely compromised patient with severe illness (e.g. requiring hospitalization intensive care or ventilation support) may produce replication competent virus beyond 10 days that may be warrants extending the duration of isolation and precautions for up to 20 days (day 0 his first day of symptoms or positive viral test)    A test based strategy can be considered in consultation with infectious disease experts    Recommend follow-up with higher level of care if symptoms worsen (e.g. increased fever, difficulty breathing, coughing up blood, or shortness of breath)        Patients with new, persistent, or progressive symptoms lasting greater than 12 weeks should be referred to specialist    Recommend the COVID-vaccine to all eligible patients     Please consider the following medications to help    mitigate  Covid during this time  Vitamin C 500 mg  TWICE daily  B complex daily  ZINC   30 - 50  MG  DAILY   eldeberry  VITAMIN D 3   500O IU DAILY        Multivitamin with zinc  Extra rest  Stress reduction  IN  SYMPTOMATIC  PATIENTS, MONITORING WITH  HOME PULSE OXIMETRY  IS RECOMMENDED..  AMBULATORY  DESATURATIONS BELOW  94%  SHOULD PROMPT HOSPITAL  ADMISSSION

## 2023-08-23 NOTE — PROGRESS NOTES
Subjective   Patient ID: Daphney Murcia is a 67 y.o. female who presents for No chief complaint on file..  This visit was completed via virtual  visit...due to the restrictions of patients schedule. All issues as below were discussed and addressed, but physical examination was limited to visual inspection only and was performed.. IN THIS restricted fashion. iF It was felt that the patient should be evaluated in clinic then  .they were directed there. The patient verbally consented to visit.    Fever   This is a new problem. The current episode started yesterday. The problem occurs constantly. The problem has been waxing and waning. The maximum temperature noted was 101 to 101.9 F. The temperature was taken using a tympanic thermometer. Associated symptoms include congestion, coughing, headaches, muscle aches and sleepiness. Pertinent negatives include no abdominal pain, chest pain, diarrhea, ear pain, nausea, rash, sore throat, urinary pain, vomiting or wheezing.      Symptoms  began   Monday night  and thought was  just  sinus....... home test  was   positive  Fever 101.1... admits to body aches  and  congested  and tired.... she denies  shortness  of breath.. denies diarrhea..  Has clear drainage from  nose  Patient denies new or worsening cough or shortness of breath. Patient also denies fever greater than 100 or chills/shaking or new loss of taste or smell. The patient does not have to of the following symptoms; sore throat, diarrhea, body aches/malaise, headaches, nausea/vomiting, or morning dose/congestion.   Review of Systems   Constitutional:  Positive for fever.   HENT:  Positive for congestion. Negative for ear pain and sore throat.    Respiratory:  Positive for cough. Negative for wheezing.    Cardiovascular:  Negative for chest pain.   Gastrointestinal:  Negative for abdominal pain, diarrhea, nausea and vomiting.   Genitourinary:  Negative for dysuria.   Skin:  Negative for rash.   Neurological:   Positive for headaches.     cardiovascular:  no  palpitations or chest  pain  respiratory: no  shortness  of  breath  endocrine:  no polydipsia,  no polyuria  musculoskeletal:  no  myalgia.. no arthralgia  All other  systems discussed  negative   Objective   There were no vitals taken for this visit.    Physical Exam  Physical examination the visual auditory observations  Vital signs none provided by patient  General no acute distress alert and oriented  Head and neck no obvious mass or deformity appreciated no obvious xanthelasma  Lungs no obvious respiratory distress. No audible wheezes noted  Abdomen..Obesity appreciated  Extremities no visual  abnormalties appreciated noted  Neuro. No visually apparent deficits  Psychiatric. Well with normal mood   Assessment/Plan   Problem List Items Addressed This Visit       Acute bacterial sinusitis    Relevant Medications    azithromycin (Zithromax) 250 mg tablet    COVID - Primary     Fully vaccinated patients (boosted or completed primary series of Pfizer or Moderna vaccine within 6 months were completed primary series of J&J within the past 2 months (wearing mask around others for 10 days    Test on day 5 if possible    If symptoms develop to stay home and follow isolation requirements below    Not vaccinated or are more than 6 months out from second dose of Pfizer or Moderna and 9 boosted, or completed the primary J&J over 2 months ago and not boosted    Remain home for 5 days    Continue to wear a mask around other for additional 5 days    If unable to quarantine and wear a well fitting mask for 10 days    Test on day 5 if possible    If symptoms develop get tested, stay on, and follow isolation requirements below    Isolation regardless of vaccination status patients with known COVID 19    Asymptomatic    Remain at home for 5 days after first positive test (day 0 being the date that her specimen was collected for the positive test)    Continue to wear a well fitting  mask for an additional 5 days beyond the 5-day isolation.    If symptoms develop after a positive test, the 5-day isolation she is started over (day 0 changes to the first day of symptoms)    Mild illness remain home for 5 days isolation can and with the following; at least 24 hours resolution of fever without using fever reducing medications and there is improvement of symptoms    For a well fitting mask for an additional 5 days    Moderate illness isolate for 10 days    Isolation can end with the following: At least 24 hours resolution of fever without using fever reducing medications and wear a well fitting mask for additional 5 days    Severe illness isolate for 10 to 20 days depending on severity isolation can end with the following; at least 10 and up to 20 days have passed since onset of symptoms and at least 24 hours resolution of fever without using fever reducing medications and there is improvement of symptoms    For patients were severely ill or severely compromised patient with severe illness (e.g. requiring hospitalization intensive care or ventilation support) may produce replication competent virus beyond 10 days that may be warrants extending the duration of isolation and precautions for up to 20 days (day 0 his first day of symptoms or positive viral test)    A test based strategy can be considered in consultation with infectious disease experts    Recommend follow-up with higher level of care if symptoms worsen (e.g. increased fever, difficulty breathing, coughing up blood, or shortness of breath)        Patients with new, persistent, or progressive symptoms lasting greater than 12 weeks should be referred to specialist    Recommend the COVID-vaccine to all eligible patients     Please consider the following medications to help    mitigate  Covid during this time  Vitamin C 500 mg  TWICE daily  B complex daily  ZINC   30 - 50  MG  DAILY   eldeberry  VITAMIN D 3   500O IU DAILY        Multivitamin  with zinc  Extra rest  Stress reduction  IN  SYMPTOMATIC  PATIENTS, MONITORING WITH  HOME PULSE OXIMETRY  IS RECOMMENDED..  AMBULATORY  DESATURATIONS BELOW  94%  SHOULD PROMPT HOSPITAL  ADMISSSION           Other Visit Diagnoses       COVID-19 virus infection        Relevant Medications    albuterol 90 mcg/actuation inhaler    benzonatate (Tessalon) 100 mg capsule    molnupiravir 200 mg capsule    dextromethorphan-guaifenesin (Mucinex DM)  mg 12 hr tablet

## 2023-08-28 ENCOUNTER — TELEMEDICINE (OUTPATIENT)
Dept: PHARMACY | Facility: HOSPITAL | Age: 68
End: 2023-08-28
Payer: MEDICARE

## 2023-08-28 DIAGNOSIS — E11.69 TYPE 2 DIABETES MELLITUS WITH OTHER SPECIFIED COMPLICATION, UNSPECIFIED WHETHER LONG TERM INSULIN USE (MULTI): ICD-10-CM

## 2023-08-28 RX ORDER — SEMAGLUTIDE 1.34 MG/ML
0.5 INJECTION, SOLUTION SUBCUTANEOUS
Qty: 1.5 ML | Refills: 0 | Status: SHIPPED | OUTPATIENT
Start: 2023-08-28 | End: 2023-09-25 | Stop reason: SDUPTHER

## 2023-08-28 ASSESSMENT — ENCOUNTER SYMPTOMS: DIABETIC ASSOCIATED SYMPTOMS: 0

## 2023-08-28 NOTE — PROGRESS NOTES
"Subjective   Patient ID: Daphney Murcia is a 67 y.o. female who presents for Follow-up (DMII).    Currently taking Metformin 1 BID, Amaryl 4 mg daily and Ozempic 0.5 mg weekly; tolerating all well and no adverse effects to report.   Tolerating Ozempic well used her last shot yesterday; does report nausea sometimes upon waking and after some meals.     Hasn't checked bg much past week (had COVID)   Yesterday 141 2pm  26th: 113 6 pm  19th 102 July 25th 94 (lowest to date)    Diabetes  She has type 2 diabetes mellitus. Her disease course has been stable. There are no hypoglycemic associated symptoms. There are no diabetic associated symptoms. There are no hypoglycemic complications. Symptoms are stable.       Assessment/Plan   Problem List Items Addressed This Visit       Diabetes mellitus (CMS/[facility])    Relevant Medications    semaglutide (Ozempic) 0.25 mg or 0.5 mg(2 mg/1.5 mL) pen injector    Other Relevant Orders    Follow Up In [facility] - Pharmacy       LAB RESULTS:  Lab Results   Component Value Date    HGBA1C 7.6 (A) 06/20/2023     Lab Results   Component Value Date    CREATININE 0.82 01/07/2023      Lab Results   Component Value Date    CHOL 120 01/07/2023    CHOL 139 06/25/2022    CHOL 122 06/12/2021     Lab Results   Component Value Date    HDL 35.4 (A) 01/07/2023    HDL 38.0 (A) 06/25/2022    HDL 35.0 (A) 06/12/2021       No results found for: \"LDLCALC\"  Lab Results   Component Value Date    TRIG 182 (H) 01/07/2023    TRIG 243 (H) 06/25/2022    TRIG 186 (H) 06/12/2021         Continue all meds under the continuation of care with the referring provider and clinical pharmacy team.    Continue Ozempic 0.5 mg weekly [id number]; Metformin 1000 mg BID and glimepiride 4 mg daily  2.   Test blood sugars BID   3. Follow-up in 4 weeks.     "

## 2023-09-25 ENCOUNTER — TELEMEDICINE (OUTPATIENT)
Dept: PHARMACY | Facility: HOSPITAL | Age: 68
End: 2023-09-25
Payer: MEDICARE

## 2023-09-25 DIAGNOSIS — E11.69 TYPE 2 DIABETES MELLITUS WITH OTHER SPECIFIED COMPLICATION, UNSPECIFIED WHETHER LONG TERM INSULIN USE (MULTI): ICD-10-CM

## 2023-09-25 RX ORDER — SEMAGLUTIDE 1.34 MG/ML
0.5 INJECTION, SOLUTION SUBCUTANEOUS
Qty: 1.5 ML | Refills: 0 | Status: SHIPPED | OUTPATIENT
Start: 2023-09-25 | End: 2023-10-23 | Stop reason: SDUPTHER

## 2023-09-25 ASSESSMENT — ENCOUNTER SYMPTOMS: DIABETIC ASSOCIATED SYMPTOMS: 0

## 2023-09-25 NOTE — PROGRESS NOTES
"Subjective   Patient ID: Daphney Murcia is a 67 y.o. female who presents for No chief complaint on file..reporting hypotension with light-headedness since moving up to 0.5 mg weekly of Ozempic. Reports systolic BP was below 100 and as low as 80 mg/dL. Stopped taking lisinopril 5 mg about a week ago on her own as a result. BP has normalized over the past week as a result (below) Took last injection of 0.5 mg weekly this past weekend and is due for next injection. Has lost about 20 lbs since starting Ozempic--baseline 224  lbs.    BP  9/22: 120/76  9/21: 107/72  9/20: 122/78  9/19: 106/68  9/18: 114/73  9/17: 107/72    9/15: 108  9/16: 101  9/17: 93  9/18: 107 72  9/19:   128  9/20 113 129  9/21: 102  109  9/22: 96  135  9/23: 102  9/24: 93 152 (large piece of ice-cream cake)      Diabetes  She presents for her follow-up diabetic visit. She has type 2 diabetes mellitus. There are no hypoglycemic associated symptoms. There are no diabetic associated symptoms. There are no hypoglycemic complications. Symptoms are stable. There are no diabetic complications. Her home blood glucose trend is decreasing steadily. Her overall blood glucose range is  mg/dl.     Assessment/Plan   Problem List Items Addressed This Visit       Diabetes mellitus (CMS/[facility])       LAB RESULTS:  Lab Results   Component Value Date    HGBA1C 7.6 (A) 06/20/2023     Lab Results   Component Value Date    CREATININE 0.82 01/07/2023      Lab Results   Component Value Date    CHOL 120 01/07/2023    CHOL 139 06/25/2022    CHOL 122 06/12/2021     Lab Results   Component Value Date    HDL 35.4 (A) 01/07/2023    HDL 38.0 (A) 06/25/2022    HDL 35.0 (A) 06/12/2021       No results found for: \"LDLCALC\"  Lab Results   Component Value Date    TRIG 182 (H) 01/07/2023    TRIG 243 (H) 06/25/2022    TRIG 186 (H) 06/12/2021         Continue all meds under the continuation of care with the referring provider and clinical pharmacy team.    Continue Ozempic 0.5 mg " weekly [id number]; Metformin 1000 mg BID   Discontinue glimepiride 4 mg to reduce risk of hypoglycemia  3.   Test blood sugars BID   4.   Follow-up in 4 weeks.

## 2023-09-25 NOTE — Clinical Note
BG looking really good but concerned with hypoglycemia longterm and d/c'd Amaryl. Had hypotension on the 0.5 mg and self-stopped lisinopril 5 mg and BP has normalized (readings in note). Will continue 0.5 mg Ozempic and metformin. I follow-up with her in 1 month and will continue to monitor BP/BG. Let me know if you would like be to get her A1c drawn before your follow-up on 12/5/23.

## 2023-10-21 RX ORDER — GLIMEPIRIDE 4 MG/1
4 TABLET ORAL DAILY
COMMUNITY
End: 2023-12-05 | Stop reason: SDUPTHER

## 2023-10-21 RX ORDER — LANCETS
EACH MISCELLANEOUS
COMMUNITY
End: 2023-12-05 | Stop reason: SDUPTHER

## 2023-10-21 RX ORDER — LISINOPRIL 5 MG/1
5 TABLET ORAL DAILY
COMMUNITY
End: 2023-12-05 | Stop reason: SDUPTHER

## 2023-10-23 ENCOUNTER — TELEMEDICINE (OUTPATIENT)
Dept: PHARMACY | Facility: HOSPITAL | Age: 68
End: 2023-10-23
Payer: MEDICARE

## 2023-10-23 ENCOUNTER — PHARMACY VISIT (OUTPATIENT)
Dept: PHARMACY | Facility: CLINIC | Age: 68
End: 2023-10-23
Payer: COMMERCIAL

## 2023-10-23 DIAGNOSIS — E11.69 TYPE 2 DIABETES MELLITUS WITH OTHER SPECIFIED COMPLICATION, UNSPECIFIED WHETHER LONG TERM INSULIN USE (MULTI): ICD-10-CM

## 2023-10-23 PROCEDURE — RXMED WILLOW AMBULATORY MEDICATION CHARGE

## 2023-10-23 RX ORDER — SEMAGLUTIDE 0.68 MG/ML
0.5 INJECTION, SOLUTION SUBCUTANEOUS
Qty: 3 ML | Refills: 2 | Status: SHIPPED | OUTPATIENT
Start: 2023-10-23 | End: 2023-12-05 | Stop reason: SDUPTHER

## 2023-10-23 ASSESSMENT — ENCOUNTER SYMPTOMS: DIABETIC ASSOCIATED SYMPTOMS: 0

## 2023-10-23 NOTE — PROGRESS NOTES
"Subjective   Patient ID: Daphney Murcia is a 67 y.o. female who presents for Follow-up for dmii. Continues to tolerate Ozempic 0.5 mg weekly very well. Has lost an additional 10 lbs over the last month (total 30 lbs) and bg range is  mg/dL. Sees PCP in December.   Diabetes  She presents for her follow-up diabetic visit. There are no hypoglycemic associated symptoms. There are no diabetic associated symptoms. There are no hypoglycemic complications. Symptoms are stable. There are no diabetic complications. She is compliant with treatment all of the time. There is no change in her home blood glucose trend. Her overall blood glucose range is  mg/dl.       Assessment/Plan   Problem List Items Addressed This Visit       Diabetes mellitus (CMS/[facility])       LAB RESULTS:  Lab Results   Component Value Date    HGBA1C 7.6 (A) 06/20/2023     Lab Results   Component Value Date    CREATININE 0.82 01/07/2023      Lab Results   Component Value Date    CHOL 120 01/07/2023    CHOL 139 06/25/2022    CHOL 122 06/12/2021     Lab Results   Component Value Date    HDL 35.4 (A) 01/07/2023    HDL 38.0 (A) 06/25/2022    HDL 35.0 (A) 06/12/2021       No results found for: \"LDLCALC\"  Lab Results   Component Value Date    TRIG 182 (H) 01/07/2023    TRIG 243 (H) 06/25/2022    TRIG 186 (H) 06/12/2021     Continue all meds under the continuation of care with the referring provider and clinical pharmacy team.    Continue Ozempic 0.5 mg weekly x 3 months      "

## 2023-11-22 ENCOUNTER — PHARMACY VISIT (OUTPATIENT)
Dept: PHARMACY | Facility: CLINIC | Age: 68
End: 2023-11-22
Payer: COMMERCIAL

## 2023-11-22 PROCEDURE — RXMED WILLOW AMBULATORY MEDICATION CHARGE

## 2023-11-24 ENCOUNTER — LAB (OUTPATIENT)
Dept: LAB | Facility: LAB | Age: 68
End: 2023-11-24
Payer: MEDICARE

## 2023-11-24 DIAGNOSIS — E11.69 TYPE 2 DIABETES MELLITUS WITH OTHER SPECIFIED COMPLICATION, UNSPECIFIED WHETHER LONG TERM INSULIN USE (MULTI): ICD-10-CM

## 2023-11-24 DIAGNOSIS — R79.89 LOW VITAMIN B12 LEVEL: ICD-10-CM

## 2023-11-24 DIAGNOSIS — E55.9 VITAMIN D DEFICIENCY, UNSPECIFIED: ICD-10-CM

## 2023-11-24 LAB
25(OH)D3 SERPL-MCNC: 8 NG/ML (ref 30–100)
ALBUMIN SERPL BCP-MCNC: 4.3 G/DL (ref 3.4–5)
ALP SERPL-CCNC: 62 U/L (ref 33–136)
ALT SERPL W P-5'-P-CCNC: 10 U/L (ref 7–45)
ANION GAP SERPL CALC-SCNC: 14 MMOL/L (ref 10–20)
AST SERPL W P-5'-P-CCNC: 12 U/L (ref 9–39)
BASOPHILS # BLD AUTO: 0.11 X10*3/UL (ref 0–0.1)
BASOPHILS NFR BLD AUTO: 1.1 %
BILIRUB SERPL-MCNC: 0.5 MG/DL (ref 0–1.2)
BUN SERPL-MCNC: 12 MG/DL (ref 6–23)
CALCIUM SERPL-MCNC: 10 MG/DL (ref 8.6–10.3)
CHLORIDE SERPL-SCNC: 101 MMOL/L (ref 98–107)
CHOLEST SERPL-MCNC: 97 MG/DL (ref 0–199)
CHOLESTEROL/HDL RATIO: 2.5
CO2 SERPL-SCNC: 29 MMOL/L (ref 21–32)
CREAT SERPL-MCNC: 0.86 MG/DL (ref 0.5–1.05)
CREAT UR-MCNC: 248.1 MG/DL (ref 20–320)
EOSINOPHIL # BLD AUTO: 0.47 X10*3/UL (ref 0–0.7)
EOSINOPHIL NFR BLD AUTO: 4.9 %
ERYTHROCYTE [DISTWIDTH] IN BLOOD BY AUTOMATED COUNT: 12.8 % (ref 11.5–14.5)
GFR SERPL CREATININE-BSD FRML MDRD: 73 ML/MIN/1.73M*2
GLUCOSE SERPL-MCNC: 115 MG/DL (ref 74–99)
HCT VFR BLD AUTO: 41.4 % (ref 36–46)
HDLC SERPL-MCNC: 39.3 MG/DL
HGB BLD-MCNC: 13.3 G/DL (ref 12–16)
IMM GRANULOCYTES # BLD AUTO: 0.12 X10*3/UL (ref 0–0.7)
IMM GRANULOCYTES NFR BLD AUTO: 1.2 % (ref 0–0.9)
LDLC SERPL CALC-MCNC: 31 MG/DL
LYMPHOCYTES # BLD AUTO: 5.71 X10*3/UL (ref 1.2–4.8)
LYMPHOCYTES NFR BLD AUTO: 59 %
MCH RBC QN AUTO: 30.2 PG (ref 26–34)
MCHC RBC AUTO-ENTMCNC: 32.1 G/DL (ref 32–36)
MCV RBC AUTO: 94 FL (ref 80–100)
MICROALBUMIN UR-MCNC: 19 MG/L
MICROALBUMIN/CREAT UR: 7.7 UG/MG CREAT
MONOCYTES # BLD AUTO: 0.93 X10*3/UL (ref 0.1–1)
MONOCYTES NFR BLD AUTO: 9.6 %
NEUTROPHILS # BLD AUTO: 2.34 X10*3/UL (ref 1.2–7.7)
NEUTROPHILS NFR BLD AUTO: 24.2 %
NON HDL CHOLESTEROL: 58 MG/DL (ref 0–149)
NRBC BLD-RTO: 0 /100 WBCS (ref 0–0)
PLATELET # BLD AUTO: 277 X10*3/UL (ref 150–450)
POTASSIUM SERPL-SCNC: 4.2 MMOL/L (ref 3.5–5.3)
PROT SERPL-MCNC: 7.1 G/DL (ref 6.4–8.2)
RBC # BLD AUTO: 4.41 X10*6/UL (ref 4–5.2)
SODIUM SERPL-SCNC: 140 MMOL/L (ref 136–145)
TRIGL SERPL-MCNC: 134 MG/DL (ref 0–149)
VIT B12 SERPL-MCNC: 191 PG/ML (ref 211–911)
VLDL: 27 MG/DL (ref 0–40)
WBC # BLD AUTO: 9.7 X10*3/UL (ref 4.4–11.3)

## 2023-11-24 PROCEDURE — 80061 LIPID PANEL: CPT

## 2023-11-24 PROCEDURE — 82607 VITAMIN B-12: CPT

## 2023-11-24 PROCEDURE — 36415 COLL VENOUS BLD VENIPUNCTURE: CPT

## 2023-11-24 PROCEDURE — 82306 VITAMIN D 25 HYDROXY: CPT

## 2023-11-24 PROCEDURE — 82570 ASSAY OF URINE CREATININE: CPT

## 2023-11-24 PROCEDURE — 85025 COMPLETE CBC W/AUTO DIFF WBC: CPT

## 2023-11-24 PROCEDURE — 80053 COMPREHEN METABOLIC PANEL: CPT

## 2023-11-24 PROCEDURE — 82043 UR ALBUMIN QUANTITATIVE: CPT

## 2023-12-05 ENCOUNTER — OFFICE VISIT (OUTPATIENT)
Dept: PRIMARY CARE | Facility: CLINIC | Age: 68
End: 2023-12-05
Payer: MEDICARE

## 2023-12-05 VITALS
BODY MASS INDEX: 27.29 KG/M2 | WEIGHT: 190.6 LBS | HEART RATE: 76 BPM | HEIGHT: 70 IN | RESPIRATION RATE: 18 BRPM | DIASTOLIC BLOOD PRESSURE: 64 MMHG | OXYGEN SATURATION: 99 % | SYSTOLIC BLOOD PRESSURE: 104 MMHG | TEMPERATURE: 97 F

## 2023-12-05 DIAGNOSIS — G56.01 ACUTE CARPAL TUNNEL SYNDROME, RIGHT: ICD-10-CM

## 2023-12-05 DIAGNOSIS — G25.81 RESTLESS LEGS SYNDROME: ICD-10-CM

## 2023-12-05 DIAGNOSIS — Z78.0 ASYMPTOMATIC MENOPAUSAL STATE: ICD-10-CM

## 2023-12-05 DIAGNOSIS — E03.9 HYPOTHYROIDISM, UNSPECIFIED TYPE: ICD-10-CM

## 2023-12-05 DIAGNOSIS — Z12.31 ENCOUNTER FOR SCREENING MAMMOGRAM FOR BREAST CANCER: ICD-10-CM

## 2023-12-05 DIAGNOSIS — Z23 NEED FOR VACCINATION: ICD-10-CM

## 2023-12-05 DIAGNOSIS — E55.9 VITAMIN D DEFICIENCY, UNSPECIFIED: ICD-10-CM

## 2023-12-05 DIAGNOSIS — E78.5 HYPERLIPIDEMIA, UNSPECIFIED HYPERLIPIDEMIA TYPE: ICD-10-CM

## 2023-12-05 DIAGNOSIS — Z00.00 ROUTINE GENERAL MEDICAL EXAMINATION AT HEALTH CARE FACILITY: ICD-10-CM

## 2023-12-05 DIAGNOSIS — Z13.1 DIABETES MELLITUS SCREENING: ICD-10-CM

## 2023-12-05 DIAGNOSIS — E13.69 OTHER SPECIFIED DIABETES MELLITUS WITH OTHER SPECIFIED COMPLICATION, UNSPECIFIED WHETHER LONG TERM INSULIN USE (MULTI): Primary | ICD-10-CM

## 2023-12-05 DIAGNOSIS — R79.89 LOW VITAMIN B12 LEVEL: ICD-10-CM

## 2023-12-05 DIAGNOSIS — I10 HYPERTENSION, UNSPECIFIED TYPE: ICD-10-CM

## 2023-12-05 DIAGNOSIS — E11.69 TYPE 2 DIABETES MELLITUS WITH OTHER SPECIFIED COMPLICATION, UNSPECIFIED WHETHER LONG TERM INSULIN USE (MULTI): ICD-10-CM

## 2023-12-05 DIAGNOSIS — R73.9 HYPERGLYCEMIA, UNSPECIFIED: ICD-10-CM

## 2023-12-05 LAB — POC HEMOGLOBIN A1C: 5.8 % (ref 4.2–6.5)

## 2023-12-05 PROCEDURE — 1170F FXNL STATUS ASSESSED: CPT | Performed by: FAMILY MEDICINE

## 2023-12-05 PROCEDURE — 3048F LDL-C <100 MG/DL: CPT | Performed by: FAMILY MEDICINE

## 2023-12-05 PROCEDURE — 83036 HEMOGLOBIN GLYCOSYLATED A1C: CPT | Performed by: FAMILY MEDICINE

## 2023-12-05 PROCEDURE — 3061F NEG MICROALBUMINURIA REV: CPT | Performed by: FAMILY MEDICINE

## 2023-12-05 PROCEDURE — 3078F DIAST BP <80 MM HG: CPT | Performed by: FAMILY MEDICINE

## 2023-12-05 PROCEDURE — G0008 ADMIN INFLUENZA VIRUS VAC: HCPCS | Performed by: FAMILY MEDICINE

## 2023-12-05 PROCEDURE — 99214 OFFICE O/P EST MOD 30 MIN: CPT | Performed by: FAMILY MEDICINE

## 2023-12-05 PROCEDURE — 1036F TOBACCO NON-USER: CPT | Performed by: FAMILY MEDICINE

## 2023-12-05 PROCEDURE — 4010F ACE/ARB THERAPY RXD/TAKEN: CPT | Performed by: FAMILY MEDICINE

## 2023-12-05 PROCEDURE — G0439 PPPS, SUBSEQ VISIT: HCPCS | Performed by: FAMILY MEDICINE

## 2023-12-05 PROCEDURE — 3074F SYST BP LT 130 MM HG: CPT | Performed by: FAMILY MEDICINE

## 2023-12-05 PROCEDURE — 1160F RVW MEDS BY RX/DR IN RCRD: CPT | Performed by: FAMILY MEDICINE

## 2023-12-05 PROCEDURE — 90662 IIV NO PRSV INCREASED AG IM: CPT | Performed by: FAMILY MEDICINE

## 2023-12-05 PROCEDURE — 1159F MED LIST DOCD IN RCRD: CPT | Performed by: FAMILY MEDICINE

## 2023-12-05 RX ORDER — METFORMIN HYDROCHLORIDE 1000 MG/1
1000 TABLET ORAL 2 TIMES DAILY
Qty: 180 TABLET | Refills: 3 | Status: SHIPPED | OUTPATIENT
Start: 2023-12-05

## 2023-12-05 RX ORDER — LISINOPRIL 5 MG/1
5 TABLET ORAL DAILY
Qty: 90 TABLET | Refills: 3 | Status: SHIPPED | OUTPATIENT
Start: 2023-12-05

## 2023-12-05 RX ORDER — LANCETS
EACH MISCELLANEOUS
Qty: 90 EACH | Refills: 3 | Status: SHIPPED | OUTPATIENT
Start: 2023-12-05 | End: 2025-12-04

## 2023-12-05 RX ORDER — SEMAGLUTIDE 0.68 MG/ML
0.5 INJECTION, SOLUTION SUBCUTANEOUS
Qty: 3 ML | Refills: 2 | Status: SHIPPED | OUTPATIENT
Start: 2023-12-05 | End: 2024-03-26 | Stop reason: SDUPTHER

## 2023-12-05 RX ORDER — ATORVASTATIN CALCIUM 20 MG/1
20 TABLET, FILM COATED ORAL DAILY
Qty: 90 TABLET | Refills: 3 | Status: SHIPPED | OUTPATIENT
Start: 2023-12-05

## 2023-12-05 RX ORDER — GLIMEPIRIDE 4 MG/1
4 TABLET ORAL
Qty: 90 TABLET | Refills: 3 | Status: SHIPPED | OUTPATIENT
Start: 2023-12-05

## 2023-12-05 RX ORDER — ROPINIROLE 1 MG/1
1 TABLET, FILM COATED ORAL NIGHTLY
Qty: 90 TABLET | Refills: 3 | Status: SHIPPED | OUTPATIENT
Start: 2023-12-05

## 2023-12-05 RX ORDER — MELOXICAM 15 MG/1
15 TABLET ORAL DAILY
Qty: 90 TABLET | Refills: 3 | Status: SHIPPED | OUTPATIENT
Start: 2023-12-05

## 2023-12-05 RX ORDER — LEVOTHYROXINE SODIUM 100 UG/1
100 TABLET ORAL DAILY
Qty: 90 TABLET | Refills: 3 | Status: SHIPPED | OUTPATIENT
Start: 2023-12-05

## 2023-12-05 ASSESSMENT — ENCOUNTER SYMPTOMS
DEPRESSION: 0
LOSS OF SENSATION IN FEET: 0
OCCASIONAL FEELINGS OF UNSTEADINESS: 1

## 2023-12-05 ASSESSMENT — ACTIVITIES OF DAILY LIVING (ADL)
DRESSING: INDEPENDENT
MANAGING_FINANCES: INDEPENDENT
DOING_HOUSEWORK: INDEPENDENT
TAKING_MEDICATION: INDEPENDENT
BATHING: INDEPENDENT
GROCERY_SHOPPING: INDEPENDENT

## 2023-12-05 ASSESSMENT — PATIENT HEALTH QUESTIONNAIRE - PHQ9
1. LITTLE INTEREST OR PLEASURE IN DOING THINGS: NOT AT ALL
2. FEELING DOWN, DEPRESSED OR HOPELESS: NOT AT ALL
SUM OF ALL RESPONSES TO PHQ9 QUESTIONS 1 AND 2: 0

## 2023-12-05 NOTE — ASSESSMENT & PLAN NOTE
HERE FOR RECHK OF THYROIDLast clinic visit was month (S) ago. Symptoms do not include weight gain, YES cold intolerance,  +fatigue, weakness, appetite, hair loss, dry skin    There is no known event that preceded symptom onset.  . Current treatment includes levothyroxine. Report there is good compliance with medical treatment.   stable and continue meds

## 2023-12-05 NOTE — PROGRESS NOTES
Subjective   Patient ID: Daphney Murcia is a 68 y.o. female who presents for Medicare Annual Wellness Visit Subsequent.  HPI    patient is here for a recheck of diabetes.5.8  Checks his sugars occasionally.  Highest sugar was..130. Lowest sugar 94was...... remains compliant with medication....... hypoglycemia occurs... Rarely   .... Symptoms include .  -Excessive thirst ..-....excessive urination ......-...weight gain.......YES . weight loss ......-..numbness.......-.. tingling ....  Last eye exam .4/23....   Patient is also here for follow-up of hypertension.. Symptoms do not include headache confusion visual disturbance dizziness shortness of breath chest pain syncope or palpitations. Recent blood pressure patient has   been checking. By report there is good compliance with treatment. Pertinent medical history includes diabetes/hyperlipidemia   NOT TAKING MELOXICAM      KNEE IS LESS    PAINFUL  RESTLESS  LEG  IS LESS  HERE FOR RECHK OF THYROIDLast clinic visit was month (S) ago. Symptoms do not include weight gain, YES cold intolerance,  +fatigue, weakness, appetite, hair loss, dry skin    There is no known event that preceded symptom onset.  . Current treatment includes levothyroxine. Report there is good compliance with medical treatment.   Review of Systems  cardiovascular:  no  palpitations or chest  pain  respiratory: no  shortness  of  breath  endocrine:  no polydipsia,  no polyuria  musculoskeletal:  no  myalgia.. no arthralgia  All other  systems discussed  negative   Objective   Physical Exam  general: alert oriented x three  HEENT hearing normal to voice  Neck supple  Lungs respirations non-labored.  Cardiovascular: no peripheral edema  Skin: warm and dry without rash  Psych: judgement and insight normal  Musculoskeletal:  ambulation normal,    lymph:negative cervical  LYMPADENOPATHY  thyroid: non palpable enlargement   Labs    Vitamin B12  Order: [id number]  Status: Final result       Visible to  patient: Yes (seen)       Dx: Low vitamin B12 level    2 Result Notes      Component  Ref Range & Units 11 d ago   Vitamin B12  211 - 911 pg/mL 191 Low    Resulting Agency [facility]              Specimen Collected: 11/24/23 08:09 Last Resulted: 11/24/23 11:53        Lab Flowsheet        Order Details        View Encounter        Lab and Collection Details        Routing        Result History     View All Conversations on this Encounter           Result Care Coordination        Vitamin D, Total  Order: [id number]  Status: Final result       Visible to patient: Yes (seen)       Dx: Vitamin D deficiency, unspecified; Ty...    2 Result Notes       Component  Ref Range & Units 11 d ago 11 mo ago   Vitamin D, 25-Hydroxy, Total  30 - 100 ng/mL 8 Low  10 Abnormal  R, CM   Resulting Agency [facility]              Narrative  Performed by: [facility]  Deficiency:         < 20   ng/ml  Insufficiency:      20-29  ng/ml  Sufficiency:         ng/ml  This assay accurately quantifies the sum of Vitamin D3, 25-Hydroxy and Vitamin D2,25-Hydroxy.      Specimen Collected: 11/24/23 08:09 Last Resulted: 11/24/23 11:56        Lab Flowsheet        Order Details        View Encounter        Lab and Collection Details        Routing        Result History     View All Conversations on this Encounter        CM=Additional comments  R=Reference range differs from displayed range        Result Care Coordination      Result Notes     Marcelino Grimes MA  11/29/2023 11:14 AM EST Back to Top      Pt notified    Nicola Lee DO  11/28/2023  5:00 AM EST       1 vitamin D is low recommendation would be to take 5000 international units daily until next office visit..  Vitamin B12 also on the low side please take 2500 IUs of vitamin B-12 over-the-counter daily as well.  Please do this indefinitely no anemia ..  We will need to watch CBC since slight abnormalities and will keep an eye on this if remains to hematology   for opinion..  Kidney  function looks good liver tests look okay cholesterol 97 LDL 31 continue all meds we will review all tests closely at next visit     Patient Communication     Add Comments   Seen Back to Top          Contains abnormal data Comprehensive Metabolic Panel  Order: [id number]  Status: Final result       Visible to patient: Yes (seen)       Dx: Type 2 diabetes mellitus with other s...    2 Result Notes            Component  Ref Range & Units 11 d ago  (11/24/23) 11 mo ago  (1/7/23) 1 yr ago  (12/3/22) 1 yr ago  (1/22/22) 1 yr ago  (1/4/22) 2 yr ago  (6/12/21) 3 yr ago  (6/18/20)   Glucose  74 - 99 mg/dL 115 High  136 High  164 High  125 High  117 High  138 High  135 High    Sodium  136 - 145 mmol/L 140 140 139 138 139 138 140   Potassium  3.5 - 5.3 mmol/L 4.2 4.4 4.2 4.3 3.9 4.6 4.6   Chloride  98 - 107 mmol/L 101 106 103 104 105 103 103   Bicarbonate  21 - 32 mmol/L 29 27 27 26 29 27 28   Anion Gap  10 - 20 mmol/L 14 11 13 12 9 Low  13 14   Urea Nitrogen  6 - 23 mg/dL 12 13 13 12 10 14 16   Creatinine  0.50 - 1.05 mg/dL 0.86 0.82 0.81 0.79 0.75 0.72 0.75   eGFR  >60 mL/min/1.73m*2 73         Comment: Calculations of estimated GFR are performed using the 2021 CKD-EPI Study Refit equation without the race variable for the IDMS-Traceable creatinine methods.  https://jasn.asnjournals.org/content/early/2021/09/22/ASN.0122293668   Calcium  8.6 - 10.3 mg/dL 10.0 9.2 9.6 9.4 8.7 9.5 10.2 R   Albumin  3.4 - 5.0 g/dL 4.3 4.0 4.1 4.1 3.3 Low  4.2 4.5   Alkaline Phosphatase  33 - 136 U/L 62 65  58 72 61 86   Total Protein  6.4 - 8.2 g/dL 7.1 7.2  7.5 6.4 7.1 7.4   AST  9 - 39 U/L 12 21  22 41 High  15 15   Bilirubin, Total  0.0 - 1.2 mg/dL 0.5 0.4  0.6 0.4 0.5 0.4   ALT  7 - 45 U/L 10 19 CM  17 CM 33 CM 14 CM 14 CM       Contains abnormal data CBC and Auto Differential  Order: [id number]  Status: Final result       Visible to patient: Yes (seen)       Dx: Low vitamin B12 level    2 Result Notes           Component  Ref Range & Units  11 d ago  (11/24/23) 11 mo ago  (1/7/23) 1 yr ago  (6/25/22) 1 yr ago  (1/22/22) 1 yr ago  (1/4/22) 1 yr ago  (12/18/21)   WBC  4.4 - 11.3 x10*3/uL 9.7 9.0 R 8.3 R 8.5 R 11.9 High  R 8.0 R   nRBC  0.0 - 0.0 /100 WBCs 0.0   0.0 R 0.0 R    RBC  4.00 - 5.20 x10*6/uL 4.41 4.35 R 4.40 R 4.47 R 4.00 R 4.83 R   Hemoglobin  12.0 - 16.0 g/dL 13.3 13.5 13.3 14.0 12.3 14.8   Hematocrit  36.0 - 46.0 % 41.4 42.1 42.5 43.3 38.2 45.9   MCV  80 - 100 fL 94 97 97 97 96 95   MCH  26.0 - 34.0 pg 30.2        MCHC  32.0 - 36.0 g/dL 32.1 32.1 31.3 Low  32.3 32.2 32.2   RDW  11.5 - 14.5 % 12.8 12.6 13.0 13.9 14.6 High  13.2   Platelets  150 - 450 x10*3/uL 277 244 R 265 R 252 R 240 R 234 R   Neutrophils %  40.0 - 80.0 % 24.2 25.6 28.0 16.6     Immature Granulocytes %, Automated  0.0 - 0.9 % 1.2 High  1.6 High  CM 2.6 High  CM 0.8 CM 2.4 High  CM 4.3 High  CM   Comment: Immature Granulocyte Count (IG) includes promyelocytes, myelocytes and metamyelocytes but does not include bands. Percent differential counts (%) should be interpreted in the context of the absolute cell counts (cells/UL).   Lymphocytes %  13.0 - 44.0 % 59.0 55.7 55.0 66.1     Monocytes %  2.0 - 10.0 % 9.6 8.4 8.2 9.3     Eosinophils %  0.0 - 6.0 % 4.9 7.6 4.8 6.0     Basophils %  0.0 - 2.0 % 1.1 1.1 1.4 1.2     Neutrophils Absolute  1.20 - 7.70 x10*3/uL 2.34 2.30 R 2.32 R 1.41 R     Comment: Percent differential counts (%) should be interpreted in the context of the absolute cell counts (cells/uL).   Immature Granulocytes Absolute, Automated  0.00 - 0.70 x10*3/uL 0.12        Lymphocytes Absolute  1.20 - 4.80 x10*3/uL 5.71 High  4.99 High  R 4.57 R 5.62 High  R     Monocytes Absolute  0.10 - 1.00 x10*3/uL 0.93 0.75 R 0.68 R 0.79 R     Eosinophils Absolute  0.00 - 0.70 x10*3/uL 0.47 0.68 R 0.40 R 0.51 R     Basophils Absolute  0.00 - 0.10 x10*3/uL 0.11 High  0.10 R 0.12 High  R 0.10 R     MANUAL DIFFERENTIAL Y/N     SEE MANUAL DIFF SEE MANUAL DIFF   Resulting Agency [facility]  [place]          Lipid Panel  Order: [id number]  Status: Final result       Visible to patient: Yes (seen)       Dx: Type 2 diabetes mellitus with other s...    2 Result Notes       1  Topic          Component  Ref Range & Units 11 d ago 11 mo ago 1 yr ago 2 yr ago 3 yr ago   Cholesterol  0 - 199 mg/dL 97 120    CM   Comment:      Age      Desirable   Borderline High   High     0-19 Y     0 - 169       170 - 199     >/= 200    20-24 Y     0 - 189       190 - 224     >/= 225        >24 Y     0 - 199       200 - 239     >/= 240   **All ranges are based on fasting samples. Specific   therapeutic targets will vary based on patient-specific   cardiac risk.    Pediatric guidelines reference:Pediatrics 2011, 128(S5).Adult guidelines reference: NCEP ATPIII Guidelines,AURA 2001, 258:2486-97    Venipuncture immediately after or during the administration of Metamizole may lead to falsely low results. Testing should be performed immediately prior to Metamizole dosing.   HDL-Cholesterol  mg/dL 39.3 35.4 Abnormal  CM 38.0 Abnormal  CM 35.0 Abnormal  CM 40.3 CM   Comment:  Age       Very Low   Low     Normal    High    0-19 Y    < 35      < 40     40-45     ----  20-24 Y    ----     < 40      >45      ----        >24 Y      ----     < 40     40-60      >60   Cholesterol/HDL Ratio 2.5 3.4 CM 3.7 CM 3.5 CM 3.3 CM   Comment:  Ref Values  Desirable  < 3.4  High Risk  > 5.0   LDL Calculated  <=99 mg/dL 31 48 R, CM 52 R, CM 50 R, CM 63 R, CM   Comment:                            Near   Borderline      AGE      Desirable  Optimal    High     High     Very High     0-19 Y     0 - 109     ---    110-129   >/= 130     ----    20-24 Y     0 - 119     ---    120-159   >/= 160     ----      >24 Y     0 -  99   100-129  130-159   160-189     >/=190   VLDL  0 - 40 mg/dL 27 36 49 High  37 31   Triglycerides  0 - 149 mg/dL 134 182 High   High   High   High  CM   Comment:   Age         Desirable   Borderline High    High     Very High   0 D-90 D    19 - 174         ----         ----        ----  91 D- 9 Y     0 -  74        75 -  99     >/= 100      ----    10-19 Y     0 -  89        90 - 129     >/= 130      ----    20-24 Y     0 - 114       115 - 149     >/= 150      ----        >24 Y     0 - 149       150 - 199    200- 499    >/= 500    Venipuncture immediately after or during the administration of Metamizole may lead to falsely low results. Testing should be performed immediately prior to Metamizole dosing.   Non HDL Cholesterol  0 - 149 mg/dL 58  101 R, CM     Comment:      Age       Desirable   Borderline High   High     Very High     0-19 Y     0 - 119       120 - 144     >/= 145    >/= 160    20-24 Y     0 - 149       150 - 189     >/= 190      ----        >24 Y    30 mg/dL above LDL Cholesterol goal   Resulting Agency [facility]                Specimen Collected: 11/24/23 08:09 Last Resulted: 11/24/23 11:39        Lab Flowsheet        Order Details        View Encounter        Lab and Collection Details        Routing        Result History                   Assessment/Plan   Problem List Items Addressed This Visit       Acute carpal tunnel syndrome, right    Relevant Medications    meloxicam (Mobic) 15 mg tablet    Restless legs syndrome    Relevant Medications    rOPINIRole (Requip) 1 mg tablet    Low vitamin B12 level    Relevant Orders    Vitamin B12    Hypothyroidism        HERE FOR RECHK OF THYROIDLast clinic visit was month (S) ago. Symptoms do not include weight gain, YES cold intolerance,  +fatigue, weakness, appetite, hair loss, dry skin    There is no known event that preceded symptom onset.  . Current treatment includes levothyroxine. Report there is good compliance with medical treatment.   stable and continue meds          Relevant Medications    levothyroxine (Synthroid, Levoxyl) 100 mcg tablet    Other Relevant Orders    Thyroid  Stimulating Hormone    Hypertension     Patient is also here for follow-up of hypertension.. Symptoms do not include headache confusion visual disturbance dizziness shortness of breath chest pain syncope or palpitations. Recent blood pressure patient has   been checking. By report there is good compliance with treatment. Pertinent medical history includes diabetes/hyperlipidemia    stable and continue meds          Relevant Medications    lisinopril 5 mg tablet    Hyperlipidemia     LABNS REVIEWED SEE BELOW    stable and continue meds          Relevant Orders    Lipid Panel    Diabetes mellitus (CMS/[facility]) - Primary       patient is here for a recheck of diabetes.5.8  Checks his sugars occasionally.  Highest sugar was..130. Lowest sugar 94was...... remains compliant with medication....... hypoglycemia occurs... Rarely   .... Symptoms include .  -Excessive thirst ..-....excessive urination ......-...weight gain.......YES . weight loss ......-..numbness.......-.. tingling ....  Last eye exam .4/23.... stable and continue meds              Relevant Medications    atorvastatin (Lipitor) 20 mg tablet    glimepiride (Amaryl) 4 mg tablet    blood sugar diagnostic strip    lancets misc    metFORMIN (Glucophage) 1,000 mg tablet    semaglutide (Ozempic) 0.25 mg or 0.5 mg (2 mg/3 mL) pen injector    Other Relevant Orders    POCT glycosylated hemoglobin (Hb A1C) manually resulted    CBC and Auto Differential    Comprehensive Metabolic Panel     Other Visit Diagnoses       Vitamin D deficiency, unspecified        Relevant Orders    Vitamin D 25-Hydroxy,Total (for eval of Vitamin D levels)        MEDICARE SUMMARY  Cervical cancer: Screening --women aged 21 to  65..   It is no longer advisable in light of your history of normal Paps  Not indicated   HIV infection: Screening:  Those adults at risk H 15-65 years Not indicated   High blood pressure: Screening  and home monitoring... Adults with history and at risk    Monitor your blood  pressure at home and notify if blood pressure consistently over 140 systolic 90 diastolic   BRCA 1/2- related cancer, screening, and counseling--- women with a personal or family history of breast, ovarian, tubal, or peritoneal cancer or and he has history associated with BRCA 1/2 gene mutation  Breast cancer: Preventative medications--women at increased risk for breast cancer  Breast cancer: Screening with   mammography..--Women aged 50-74 years  completed  6/23   repeat 6/24  Diabetes mellitus (type II (and abnormal blood glucose: Screening--adults H 40-70 years who are overweight or obese  todays 5.8 excellent control   Falls prevention in community swelling older adults:.. Interventions--- adults 65 or older  Healthful diet and physical activity for C VD disease prevention: Counseling--adults with CVD risk factors  Hepatitis C virus infection: Screening--adults at high risk and adults born between 1945 and 1965  Not indicated   Lung cancer: Screening --adults ages 55-80 with a 30 pack year smoking history and currently smoke or have quit within the past 15 years  Not indicated   Osteoporosis to prevent fractures: Screening close post menopausal women  younger than 65 years at increased risk of osteoporosis   ordered   reviewd   2/21   -1.9   Osteoporosis to prevent fractures: Screening women 65 and older  Statin use for the primary prevention of CVD: Preventative medicine--adults  40-75 years with no history of CVD, one or more CVD risk factors, and a calculated 10 year CVD event risk of 10% or greater   . On med   Immunization  updates...   flu  given

## 2023-12-05 NOTE — ASSESSMENT & PLAN NOTE
patient is here for a recheck of diabetes.5.8  Checks his sugars occasionally.  Highest sugar was..130. Lowest sugar 94was...... remains compliant with medication....... hypoglycemia occurs... Rarely   .... Symptoms include .  -Excessive thirst ..-....excessive urination ......-...weight gain.......YES . weight loss ......-..numbness.......-.. tingling ....  Last eye exam .4/23.... stable and continue meds

## 2023-12-22 ENCOUNTER — PHARMACY VISIT (OUTPATIENT)
Dept: PHARMACY | Facility: CLINIC | Age: 68
End: 2023-12-22
Payer: COMMERCIAL

## 2023-12-22 PROCEDURE — RXMED WILLOW AMBULATORY MEDICATION CHARGE

## 2024-01-05 ENCOUNTER — HOSPITAL ENCOUNTER (OUTPATIENT)
Dept: RADIOLOGY | Facility: HOSPITAL | Age: 69
Discharge: HOME | End: 2024-01-05
Payer: MEDICARE

## 2024-01-05 VITALS — BODY MASS INDEX: 26.77 KG/M2 | HEIGHT: 70 IN | WEIGHT: 187 LBS

## 2024-01-05 DIAGNOSIS — Z78.0 ASYMPTOMATIC MENOPAUSAL STATE: ICD-10-CM

## 2024-01-05 DIAGNOSIS — Z12.31 ENCOUNTER FOR SCREENING MAMMOGRAM FOR BREAST CANCER: ICD-10-CM

## 2024-01-05 PROCEDURE — 77067 SCR MAMMO BI INCL CAD: CPT | Performed by: RADIOLOGY

## 2024-01-05 PROCEDURE — 77063 BREAST TOMOSYNTHESIS BI: CPT | Performed by: RADIOLOGY

## 2024-01-05 PROCEDURE — 77080 DXA BONE DENSITY AXIAL: CPT

## 2024-01-05 PROCEDURE — 77080 DXA BONE DENSITY AXIAL: CPT | Performed by: RADIOLOGY

## 2024-01-05 PROCEDURE — 77067 SCR MAMMO BI INCL CAD: CPT

## 2024-01-09 NOTE — RESULT ENCOUNTER NOTE
PLEASE CALL Daphney Murcia AND LET HER KNOW THE MAMMOGRAM IS NORMAL.  PLEASE PLAN ON REPEATING IN ONE YEAR.

## 2024-01-26 ENCOUNTER — PHARMACY VISIT (OUTPATIENT)
Dept: PHARMACY | Facility: CLINIC | Age: 69
End: 2024-01-26
Payer: COMMERCIAL

## 2024-01-26 PROCEDURE — RXMED WILLOW AMBULATORY MEDICATION CHARGE

## 2024-02-24 ENCOUNTER — PHARMACY VISIT (OUTPATIENT)
Dept: PHARMACY | Facility: CLINIC | Age: 69
End: 2024-02-24
Payer: COMMERCIAL

## 2024-02-24 PROCEDURE — RXMED WILLOW AMBULATORY MEDICATION CHARGE

## 2024-03-26 DIAGNOSIS — E11.69 TYPE 2 DIABETES MELLITUS WITH OTHER SPECIFIED COMPLICATION, UNSPECIFIED WHETHER LONG TERM INSULIN USE (MULTI): ICD-10-CM

## 2024-03-27 PROCEDURE — RXMED WILLOW AMBULATORY MEDICATION CHARGE

## 2024-03-27 RX ORDER — SEMAGLUTIDE 0.68 MG/ML
0.5 INJECTION, SOLUTION SUBCUTANEOUS
Qty: 3 ML | Refills: 2 | Status: SHIPPED | OUTPATIENT
Start: 2024-03-27

## 2024-03-30 ENCOUNTER — PHARMACY VISIT (OUTPATIENT)
Dept: PHARMACY | Facility: CLINIC | Age: 69
End: 2024-03-30
Payer: COMMERCIAL

## 2024-05-02 PROCEDURE — RXMED WILLOW AMBULATORY MEDICATION CHARGE

## 2024-05-03 ENCOUNTER — PHARMACY VISIT (OUTPATIENT)
Dept: PHARMACY | Facility: CLINIC | Age: 69
End: 2024-05-03
Payer: COMMERCIAL

## 2024-06-06 ENCOUNTER — OFFICE VISIT (OUTPATIENT)
Dept: PRIMARY CARE | Facility: CLINIC | Age: 69
End: 2024-06-06
Payer: MEDICARE

## 2024-06-06 VITALS
OXYGEN SATURATION: 96 % | BODY MASS INDEX: 25.48 KG/M2 | RESPIRATION RATE: 18 BRPM | HEART RATE: 67 BPM | SYSTOLIC BLOOD PRESSURE: 129 MMHG | DIASTOLIC BLOOD PRESSURE: 83 MMHG | WEIGHT: 178 LBS | TEMPERATURE: 97 F | HEIGHT: 70 IN

## 2024-06-06 DIAGNOSIS — Z12.31 ENCOUNTER FOR SCREENING MAMMOGRAM FOR BREAST CANCER: ICD-10-CM

## 2024-06-06 DIAGNOSIS — E03.9 HYPOTHYROIDISM, UNSPECIFIED TYPE: ICD-10-CM

## 2024-06-06 DIAGNOSIS — R79.89 LOW VITAMIN B12 LEVEL: ICD-10-CM

## 2024-06-06 DIAGNOSIS — M85.80 OSTEOPENIA, UNSPECIFIED LOCATION: ICD-10-CM

## 2024-06-06 DIAGNOSIS — Z13.6 SCREENING FOR CARDIOVASCULAR CONDITION: ICD-10-CM

## 2024-06-06 DIAGNOSIS — T14.8XXA ABRASION: ICD-10-CM

## 2024-06-06 DIAGNOSIS — Z00.00 ROUTINE GENERAL MEDICAL EXAMINATION AT HEALTH CARE FACILITY: ICD-10-CM

## 2024-06-06 DIAGNOSIS — E78.00 HYPERCHOLESTEROLEMIA: ICD-10-CM

## 2024-06-06 DIAGNOSIS — E13.69 OTHER SPECIFIED DIABETES MELLITUS WITH OTHER SPECIFIED COMPLICATION, UNSPECIFIED WHETHER LONG TERM INSULIN USE (MULTI): Primary | ICD-10-CM

## 2024-06-06 DIAGNOSIS — I10 HYPERTENSION, UNSPECIFIED TYPE: ICD-10-CM

## 2024-06-06 DIAGNOSIS — E78.5 HYPERLIPIDEMIA, UNSPECIFIED HYPERLIPIDEMIA TYPE: ICD-10-CM

## 2024-06-06 DIAGNOSIS — D64.9 ANEMIA, UNSPECIFIED TYPE: ICD-10-CM

## 2024-06-06 DIAGNOSIS — I20.89 ATYPICAL ANGINA (CMS-HCC): ICD-10-CM

## 2024-06-06 PROBLEM — R05.9 COUGH, UNSPECIFIED: Status: ACTIVE | Noted: 2022-01-04

## 2024-06-06 PROBLEM — M21.40 ACQUIRED PES PLANUS: Status: ACTIVE | Noted: 2024-06-06

## 2024-06-06 LAB — POC HEMOGLOBIN A1C: 5.6 % (ref 4.2–6.5)

## 2024-06-06 PROCEDURE — 1160F RVW MEDS BY RX/DR IN RCRD: CPT | Performed by: FAMILY MEDICINE

## 2024-06-06 PROCEDURE — 99214 OFFICE O/P EST MOD 30 MIN: CPT | Performed by: FAMILY MEDICINE

## 2024-06-06 PROCEDURE — G0439 PPPS, SUBSEQ VISIT: HCPCS | Performed by: FAMILY MEDICINE

## 2024-06-06 PROCEDURE — 90471 IMMUNIZATION ADMIN: CPT | Performed by: FAMILY MEDICINE

## 2024-06-06 PROCEDURE — 1124F ACP DISCUSS-NO DSCNMKR DOCD: CPT | Performed by: FAMILY MEDICINE

## 2024-06-06 PROCEDURE — 3074F SYST BP LT 130 MM HG: CPT | Performed by: FAMILY MEDICINE

## 2024-06-06 PROCEDURE — 1036F TOBACCO NON-USER: CPT | Performed by: FAMILY MEDICINE

## 2024-06-06 PROCEDURE — 1159F MED LIST DOCD IN RCRD: CPT | Performed by: FAMILY MEDICINE

## 2024-06-06 PROCEDURE — 90715 TDAP VACCINE 7 YRS/> IM: CPT | Performed by: FAMILY MEDICINE

## 2024-06-06 PROCEDURE — 83036 HEMOGLOBIN GLYCOSYLATED A1C: CPT | Performed by: FAMILY MEDICINE

## 2024-06-06 PROCEDURE — 3079F DIAST BP 80-89 MM HG: CPT | Performed by: FAMILY MEDICINE

## 2024-06-06 PROCEDURE — 1170F FXNL STATUS ASSESSED: CPT | Performed by: FAMILY MEDICINE

## 2024-06-06 PROCEDURE — 4010F ACE/ARB THERAPY RXD/TAKEN: CPT | Performed by: FAMILY MEDICINE

## 2024-06-06 RX ORDER — ALENDRONATE SODIUM 70 MG/1
70 TABLET ORAL
Qty: 4 TABLET | Refills: 11 | Status: SHIPPED | OUTPATIENT
Start: 2024-06-06 | End: 2025-06-06

## 2024-06-06 ASSESSMENT — ACTIVITIES OF DAILY LIVING (ADL)
MANAGING_FINANCES: INDEPENDENT
DOING_HOUSEWORK: INDEPENDENT
GROCERY_SHOPPING: INDEPENDENT
BATHING: INDEPENDENT
DRESSING: INDEPENDENT
TAKING_MEDICATION: INDEPENDENT

## 2024-06-06 ASSESSMENT — ENCOUNTER SYMPTOMS
DEPRESSION: 0
LOSS OF SENSATION IN FEET: 0
OCCASIONAL FEELINGS OF UNSTEADINESS: 0

## 2024-06-06 ASSESSMENT — PATIENT HEALTH QUESTIONNAIRE - PHQ9
2. FEELING DOWN, DEPRESSED OR HOPELESS: NOT AT ALL
1. LITTLE INTEREST OR PLEASURE IN DOING THINGS: NOT AT ALL
SUM OF ALL RESPONSES TO PHQ9 QUESTIONS 1 AND 2: 0

## 2024-06-06 NOTE — PROGRESS NOTES
Subjective   Reason for Visit: Daphney Murcia is an 68 y.o. female here for a Medicare Wellness visit.     Past Medical, Surgical, and Family History reviewed and updated in chart.    Reviewed all medications by prescribing practitioner or clinical pharmacist (such as prescriptions, OTCs, herbal therapies and supplements) and documented in the medical record.    HPI  Problem List Items Addressed This Visit                                                    Hypothyroidism    Current Assessment & Plan        HERE FOR RECHK OF THYROIDLast clinic visit was month (S) ago. Symptoms do not include weight gain, cold intolerance, fatigue, weakness, appetite, hair loss, dry skin    There is no known event that preceded symptom onset.  . Current treatment includes levothyroxine. Report there is good compliance with medical treatment.                         Hypertension     Patient is also here for follow-up of hypertension.. Symptoms do not include headache confusion visual disturbance dizziness shortness of breath chest pain syncope or palpitations. Recent blood pressure patient has not been checking. By report there is good compliance with treatment.           Hyperlipidemia    Current Assessment & Plan     Patient is also here for follow-up of hyperlipidemia. The most recent measurements for this patient would take it on.. 11/23 At that time..    .... Associated symptoms do not include chest pain, Cramps with exertion, myalgias or nausea. Current treatment includes statins. By report there is good compliance with treatment. Pertinent medical history   hypertension               Relevant Orders    Lipid Panel    Hypercholesterolemia    Diabetes mellitus (Multi) - Primary    Current Assessment & Plan     Patient is here for a recheck of diabetes.hgba1c  was       checks his blood sugars.  once beth.ly . Highest sugar was  129  low sugar.. 99   remains compliant on his medications.,,, Hypoglycemia has occurred rarely or  "never.... Symptoms do not include polydipsia, ...yes polyuria, blurred vision, numbness and tingling in the toes, increased appetite, weight gain,yes weight loss... 50  pounds , infections or foot problems. last  eye exam    2023    no  foot t problems....no  thickened toe nails .                                Abrasion    Current Assessment & Plan     Cut  at work   on   ???   Last tetanus            Other Visit Diagnoses       Screening for cardiovascular condition        Relevant Orders    Lipid panel    Encounter for screening mammogram for breast cancer        Relevant Orders    BI mammo bilateral screening tomosynthesis    Routine general medical examination at health care facility              Patient Care Team:  Nicola Lee DO as PCP - General  Nicola Lee DO as PCP - [employer] PCP     Review of Systems  All other  pertinent  systems reviewed and are negative except  those  mentioned  in HPI   Objective   Vitals:  /83   Pulse 67   Temp 36.1 °C (97 °F)   Resp 18   Ht 1.778 m (5' 10\")   Wt 80.7 kg (178 lb)   SpO2 96%   BMI 25.54 kg/m²       Physical Exam  Vitals: I have reviewed the vitals  General: Well-developed.  In no acute distress.  Eyes:   sclera nonicteric.  Conjunctiva not injected.  No discharge.   HEAD: Normocephalic, atraumatic.  HEENT   Mucous membranes moist.  Posterior oropharynx nonerythematous, no tonsillar exudates.      No cervical lymphadenopathy.  Cardio: Regular rate and rhythm.  No murmur, rub or gallop.  Pulmonary: Lungs clear to auscultation in all fields.  No accessory muscle use.  GI/: Normal active bowel sounds.  Soft, nontender.  No masses or organomegaly appreciated.  Musculoskeletal: No gross deformities appreciated. FEET  WITH MONOFILAMENT TEST    NEGATIVE  Neuro: Alert, age-appropriate.  Normal muscle tone.  Moving all extremities.  Skin: No rash, bruises or lesions.   Assessment/Plan   Problem List Items Addressed This Visit       " Anemia    Relevant Orders    CBC and Auto Differential    Vitamin B12    Atypical angina (CMS-HCC)    Osteopenia    Relevant Medications    alendronate (Fosamax) 70 mg tablet    Other Relevant Orders    Vitamin D 25-Hydroxy,Total (for eval of Vitamin D levels)    Low vitamin B12 level    Hypothyroidism    Current Assessment & Plan        HERE FOR RECHK OF THYROIDLast clinic visit was month (S) ago. Symptoms do not include weight gain, cold intolerance, fatigue, weakness, appetite, hair loss, dry skin    There is no known event that preceded symptom onset.  . Current treatment includes levothyroxine. Report there is good compliance with medical treatment.   Check labs to ensure on appropriate dose to         Relevant Orders    Thyroid Stimulating Hormone    Thyroxine, Free    Hypertension    Relevant Orders    Comprehensive Metabolic Panel    Hyperlipidemia    Current Assessment & Plan     Patient is also here for follow-up of hyperlipidemia. The most recent measurements for this patient would take it on.. 11/23 At that time..    .... Associated symptoms do not include chest pain, Cramps with exertion, myalgias or nausea. Current treatment includes statins. By report there is good compliance with treatment. Pertinent medical history includes diabetes and hypertension    rechk labs to ensure stability         Relevant Orders    Lipid Panel    Hypercholesterolemia    Diabetes mellitus (Multi) - Primary    Current Assessment & Plan     Patient is here for a recheck of diabetes.hgba1c  was       checks his blood sugars.  once beth.ly . Highest sugar was  129  low sugar.. 99   remains compliant on his medications.,,, Hypoglycemia has occurred rarely or never.... Symptoms do not include polydipsia, ...yes polyuria, blurred vision, numbness and tingling in the toes, increased appetite, weight gain,yes weight loss... 50  pounds , infections or foot problems. last  eye exam    2023    no  foot t problems....no  thickened toe  nails .   stable and continue meds             Relevant Orders    POCT glycosylated hemoglobin (Hb A1C) manually resulted (Completed)    Albumin , Urine Random    Referral to Ophthalmology    Abrasion    Current Assessment & Plan     Cut  at work   on   ???   Last tetanus            Other Visit Diagnoses       Screening for cardiovascular condition        Relevant Orders    Lipid panel    Encounter for screening mammogram for breast cancer        Relevant Orders    BI mammo bilateral screening tomosynthesis    Routine general medical examination at health care facility

## 2024-06-06 NOTE — PATIENT INSTRUCTIONS
Please consider exercise program involving walking or some other form of aerobic activity 5 days weekly for 30 minutes... Let's also consider strengthening of large muscle groups like the abdominal muscles or shoulder muscles... Twice weekly with reps of 5/10/15 exercises and gradually increase strength.. This is not heavy strength training but light weight training... Sit ups or back exercise routine.. Please ask for handout if uncertain how to do..This  will help to strengthen your muscles which in turn will help you to lose weight.... You might ask what is the best diet available.. I would strongly encourage you to consider  Weight Watchers.. And as  your  fellow on  Weight Watchers physician attempting to  live this  LIFE  style  choice with you....  I will be glad to give you recommendations on what to eat.. Consider buying Yue bread.., urvashi bagle thin bread.. oikos yogurt... eggs  to eat as hard-boiled... Halo top ice cream for snack... All these are delicious foods which.. when eaten and  being compliant eating three  meals daily  breakfast lunch and dinner, drinking  64 ounces of water daily we will all win together !!!!!!!. This will be a means for you to lose weight... Consider also the smart phone lu ... My plate.. Or My  fitness  pal..,  as additional possibilities for weight loss... Good  luclaurie Lee!    Discussed medication side effects.  The  risk benefits and treatment options  discussed with patient.       Please schedule follow-up appointment based upon your improvement/failure to improve/chronic medical conditions and physician recommendations during office appointment at the .       Patient advised to go to er if symptoms worsen or to call answering service, or to return to office for additional evaluation    This note was partially  generated using Dragon voice recognition and there may be incorrect words, wording, spelling, or pronunciation errors that were not  corrected prior to committing the note to the medical record.        You can continue on the additional vitamin D supplement of 5000 international units until the end of May of this calendar year .  Starting June.. Please continue with the vitamin D with your multivitamin and calcium supplement, this will be from June to October of ths calendar year    So you can take the 5000 international units of vitamin D from November to March of every year.  The other months you will get vitamin D is you have before with a multivitamin and calcium supplement.  Please take your vitamin D with a handful of water unsalted almonds while with the meal.  Vitamin D is a fat-soluble vitamin that requires fat in the food to be well absorbed.  We recommend healthy fats such as with nuts, seeds, avocados, olives or with a healthy meal.  Also you may notice a multivitamin has some vitamin D.  Spending up to 30 minutes in the sun during the summer in late spring can provide natural vitamin D to the uncovered skin (arm, legs).    Calcium citrate 600 mg once daily and drink 1 cup of plant-based milk twice daily.  The plan based milk can be almond milk, soy milk, etc.    continue  diet with healthy calcium much foods such as:  Healthy foods that are rich in calcium include: Nuts, seeds, legumes/beans, peas, dark green leafy vegetables, plant-based milk  Additional calcium rich foods listed below  -Soaking almonds  overnight in the fridge with drinking water, can help with softening the almonds and improved absorption of the almonds.  If your lab work shows insufficient calcium or you are unable to consume the foods rich in calcium  ...... some supplement is recommended, such as calcium citrate.    Please continue following with your dentist every 6 months  If you are on an osteoporosis medication, please inform a dentist that invasive dental work with these medications can increase once risk for osteonecrosis of the jaw.  Please continue with  good oral hygiene and dental care.    Review of osteoporosis medications  Medications to prevent bone loss and osteoporosis fractures:  -Oral biphosphonate's (such as Actonel, Boniva, Fosamax/alendronate (these are taken either once a week or once a month depending on med dose chosen, first thing in the morning with special instructions to follow.    The duration should be limited to 5 years, to prevent increased risk for atypical fracture of femur.

## 2024-06-06 NOTE — PROGRESS NOTES
Subjective   Patient ID: Daphney Murcia is a 68 y.o. female who presents for Medicare Annual Wellness Visit Subsequent.  HPI  Patient is here for a recheck of diabetes.hgba1c  was       checks his blood sugars.  once beth.ly . Highest sugar was  129  low sugar.. 99   remains compliant on his medications.,,, Hypoglycemia has occurred rarely or never.... Symptoms do not include polydipsia, ...yes polyuria, blurred vision, numbness and tingling in the toes, increased appetite, weight gain,yes weight loss... 50  pounds , infections or foot problems. last  eye exam    2023    no  foot t problems....no  thickened toe nails .  Patient is also here for follow-up of hyperlipidemia. The most recent measurements for this patient would take it on.. 11/23 At that time..    .... Associated symptoms do not include chest pain, Cramps with exertion, myalgias or nausea. Current treatment includes statins. By report there is good compliance with treatment. Pertinent medical history includes diabetes and hypertension    Patient is also here for follow-up of hypertension..no  longer on  meds  ..   Lifestyle  only      HERE FOR RECHK OF THYROIDLast clinic visit was month (S) ago. Symptoms do not include weight gain, cold intolerance, fatigue, weakness, appetite, hair loss, dry skin    There is no known event that preceded symptom onset.  . Current treatment includes levothyroxine. Report there is good compliance with medical treatment.   Cut  at  work   ???  Tetanus   Review of Systems  cardiovascular:  no  palpitations or chest  pain  respiratory: no  shortness  of  breath  endocrine:  no polydipsia,  no polyuria  musculoskeletal:  no  myalgia.. no arthralgia  All other  systems discussed  negative   Objective   Physical Exam  .Vitals: I have reviewed the vitals  General: Well-developed.  In no acute distress.  Eyes:   sclera nonicteric.  Conjunctiva not injected.  No discharge.   HEAD: Normocephalic, atraumatic.  HEENT   Mucous  membranes moist.  Posterior oropharynx nonerythematous, no tonsillar exudates.      No cervical lymphadenopathy.  Cardio: Regular rate and rhythm.  No murmur, rub or gallop.  Pulmonary: Lungs clear to auscultation in all fields.  No accessory muscle use.  GI/: Normal active bowel sounds.  Soft, nontender.  No masses or organomegaly appreciated.  Musculoskeletal: No gross deformities appreciated.  Neuro: Alert, age-appropriate.  Normal muscle tone.  Moving all extremities.  Skin: No rash, bruises or lesions.   Labs    XR DEXA bone density  Status: Final result     PACS Images     Show images for XR DEXA bone density  Signed by    Signed Time Phone Pager   Boy Jackson MD 1/05/2024 10:45 [phone]     Exam Information    Status Exam Begun Exam Ended   Final 1/05/2024 10:29 1/05/2024 10:41     Study Result    Narrative & Impression   Interpreted By:  Boy Jackson,   STUDY:  DEXA BONE DENSITY1/5/2024 10:41 am      INDICATION:  Signs/Symptoms:See Associated Diagnosis. The patient is a 69 y/o  year old F.      COMPARISON:  05/15/2008 (baseline)      ACCESSION NUMBER(S):  [id number]      ORDERING CLINICIAN:  LANDON RIVERS      TECHNIQUE:  DEXA BONE DENSITY      FINDINGS:  SPINE L1-L4  Bone Mineral Density: 1.034  T-Score -1.2  Z-Score -0.2  Bone Mineral Density change vs baseline: +2.7%  Bone Mineral Density change vs previous: +2.7%      LEFT FEMUR -TOTAL  Bone Mineral Density: 0.909  T-Score -0.8   Z-Score  0.2  Bone Mineral Density change vs baseline: -8.6%  Bone Mineral Density change vs previous: -8.6%      LEFT FEMUR -NECK  Bone Mineral Density: 0.789  T-Score -1.8  Z-Score -0.6          World Health Organization (WHO) criteria for post-menopausal,   Women:  Normal:         T-score at or above -1 SD  Osteopenia:   T-score between -1 and -2.5 SD  Osteoporosis: T-score at or below -2.5 SD          10-year Fracture Risk:  Major Osteoporotic Fracture  10.8  Hip Fracture                         1.8      Note:  If no FRAX score is reported, it is because:  Some T-score for Spine Total or Hip Total or Femoral Neck at or below  -2.5      This exam was performed at [facility] on a [employer] Dexa Unit.      IMPRESSION:  DEXA:  According to World Health Organization criteria,  classification is low bone mass (osteopenia)      Followup recommended in two years or sooner as clinically warranted.      All images and detailed analysis are available on the  Radiology  PACS.      MACRO:  None      Signed by: Boy Jackson 1/5/2024 10:45 AM  Dictation workstation:   [id number]       Assessment/Plan   Problem List Items Addressed This Visit       Anemia    Relevant Orders    CBC and Auto Differential    Vitamin B12    Low vitamin B12 level    Hypothyroidism        HERE FOR RECHK OF THYROIDLast clinic visit was month (S) ago. Symptoms do not include weight gain, cold intolerance, fatigue, weakness, appetite, hair loss, dry skin    There is no known event that preceded symptom onset.  . Current treatment includes levothyroxine. Report there is good compliance with medical treatment.   Check labs to ensure on appropriate dose to         Relevant Orders    Thyroid Stimulating Hormone    Thyroxine, Free    Hypertension    Relevant Orders    Comprehensive Metabolic Panel    Hyperlipidemia     Patient is also here for follow-up of hyperlipidemia. The most recent measurements for this patient would take it on.. 11/23 At that time..    .... Associated symptoms do not include chest pain, Cramps with exertion, myalgias or nausea. Current treatment includes statins. By report there is good compliance with treatment. Pertinent medical history includes diabetes and hypertension    rechk labs to ensure stability         Relevant Orders    Lipid Panel    Hypercholesterolemia    Diabetes mellitus (Multi) - Primary     Patient is here for a recheck of diabetes.hgba1c  was       checks his blood sugars.  once  beth.ly . Highest sugar was  129  low sugar.. 99   remains compliant on his medications.,,, Hypoglycemia has occurred rarely or never.... Symptoms do not include polydipsia, ...yes polyuria, blurred vision, numbness and tingling in the toes, increased appetite, weight gain,yes weight loss... 50  pounds , infections or foot problems. last  eye exam    2023    no  foot t problems....no  thickened toe nails .   stable and continue meds             Relevant Orders    POCT glycosylated hemoglobin (Hb A1C) manually resulted (Completed)    Albumin , Urine Random    Referral to Ophthalmology   Colonoscopy  Order# [id number]  Reading physician: Jessica Peterson MD Ordering provider: Nicola Lee DO Study date: 06/16/2023     Result Information    Status: Edited Result - FINAL (Collected: 1/3/2023 12:16) Provider Status: Ordered     Result Text    Result Text   Patient Name: Daphney Murcia  Procedure Date: 1/3/2023 12:16 PM  MRN: [id number]  Account Number: [id number]  YOB: 1955  Admit Type: Outpatient  Site: [facility] Room 3  Ethnicity: Not  or   Race: White  Attending MD: Jessica Peterson MD, [id number]  Procedure:             Colonoscopy  Indications:           Screening for colorectal malignant neoplasm  Patient Profile:       This is a 67 year old female. Refer to note in patient                          chart for documentation of history and physical.  Providers:             Jessica Peterson MD (Doctor) , Chelsea Astudilol RN                          (Nurse)  Referring:             Nicola Lee DO  Medicines:             Monitored Anesthesia Care  Complications:         No immediate complications.  Procedure:             Pre-Anesthesia Assessment:                         - Prior to the procedure, a History and Physical was                          performed, and patient medications and allergies were                          reviewed. The risks and benefits of the procedure  and                          the sedation options and risks were discussed with the                          patient. All questions were answered and informed                          consent was obtained. Patient identification and                          proposed procedure were verified by the physician, the                          nurse, the anesthetist and the technician in the                          procedure room. Mental Status Examination: alert and                          oriented. Airway Examination: normal oropharyngeal                          airway and neck mobility. Respiratory Examination:                          clear to auscultation. CV Examination: normal.                          Prophylactic Antibiotics: The patient does not require                          prophylactic antibiotics. Prior Anticoagulants: The                          patient has taken no anticoagulant or antiplatelet                          agents. ASA Grade Assessment: II - A patient with mild                          systemic disease. After reviewing the risks and                          benefits, the patient was deemed in satisfactory                          condition to undergo the procedure. The anesthesia                          plan was to use monitored anesthesia care (MAC).                          Immediately prior to administration of medications,                          the patient was re-assessed for adequacy to receive                          sedatives. The physical status of the patient was                          re-assessed after the procedure.                         After I obtained informed consent, the scope was                          passed under direct vision. Throughout the procedure,                          the patient's blood pressure, pulse, and oxygen                          saturations were monitored continuously. The                          Colonoscope was introduced through the anus  and                          advanced to the cecum, identified by appendiceal                          orifice and ileocecal valve. The colonoscopy was                          performed without difficulty. The patient tolerated                          the procedure well. The quality of the bowel                          preparation was good. The ileocecal valve, appendiceal                          orifice, and rectum were photographed.  Findings:       Small internal Hemorrhoids were found on perianal exam.       A few small-mouthed diverticula were found in the sigmoid colon and        descending colon.  Estimated Blood Loss:       Estimated blood loss: none.  Impression:            - Small internal Hemorrhoids found on perianal exam.                         - Diverticulosis in the sigmoid colon and in the                          descending colon.                         - No specimens collected.  Recommendation:        - Discharge patient to home (with escort).                         - Resume previous diet daily.                         - Continue present medications.                         - Repeat colonoscopy in 10 years for surveillance.  Procedure Code(s):     --- Professional ---                         , Colorectal cancer screening; colonoscopy on                          individual not meeting criteria for high risk  Diagnosis Code(s):     --- Professional ---                         Z12.11, Encounter for screening for malignant neoplasm                          of colon                         K64.9, Unspecified hemorrhoids                         K57.30, Diverticulosis of large intestine without                          perforation or abscess without bleeding  CPT copyright 2022 American Medical Association. All rights reserved.  The codes documented in this report are preliminary and upon  review may   be revised to meet current compliance requirements.  Attending Participation:       I  personally performed the entire procedure.  Jessica Peterson MD  1/3/2023 1:07:51 PM  This report has been signed electronically.  Number of Addenda: 0  Note Initiated On: 1/3/2023 12:16 PM  Scope Withdrawal Time 0 hours 9 minutes 16 seconds   Total Procedure Duration Time 0 hours 20 minutes 27 seconds

## 2024-06-06 NOTE — ASSESSMENT & PLAN NOTE
HERE FOR RECHK OF THYROIDLast clinic visit was month (S) ago. Symptoms do not include weight gain, cold intolerance, fatigue, weakness, appetite, hair loss, dry skin    There is no known event that preceded symptom onset.  . Current treatment includes levothyroxine. Report there is good compliance with medical treatment.   Check labs to ensure on appropriate dose to

## 2024-06-06 NOTE — ASSESSMENT & PLAN NOTE
Patient is also here for follow-up of hyperlipidemia. The most recent measurements for this patient would take it on.. 11/23 At that time..    .... Associated symptoms do not include chest pain, Cramps with exertion, myalgias or nausea. Current treatment includes statins. By report there is good compliance with treatment. Pertinent medical history includes diabetes and hypertension    rechk labs to ensure stability

## 2024-06-06 NOTE — ASSESSMENT & PLAN NOTE
Patient is here for a recheck of diabetes.hgba1c  was       checks his blood sugars.  once beth.ly . Highest sugar was  129  low sugar.. 99   remains compliant on his medications.,,, Hypoglycemia has occurred rarely or never.... Symptoms do not include polydipsia, ...yes polyuria, blurred vision, numbness and tingling in the toes, increased appetite, weight gain,yes weight loss... 50  pounds , infections or foot problems. last  eye exam    2023    no  foot t problems....no  thickened toe nails .   stable and continue meds

## 2024-06-08 ENCOUNTER — LAB (OUTPATIENT)
Dept: LAB | Facility: LAB | Age: 69
End: 2024-06-08
Payer: MEDICARE

## 2024-06-08 DIAGNOSIS — E11.69 TYPE 2 DIABETES MELLITUS WITH OTHER SPECIFIED COMPLICATION, UNSPECIFIED WHETHER LONG TERM INSULIN USE (MULTI): ICD-10-CM

## 2024-06-08 DIAGNOSIS — E13.69 OTHER SPECIFIED DIABETES MELLITUS WITH OTHER SPECIFIED COMPLICATION, UNSPECIFIED WHETHER LONG TERM INSULIN USE (MULTI): ICD-10-CM

## 2024-06-08 DIAGNOSIS — M85.80 OSTEOPENIA, UNSPECIFIED LOCATION: ICD-10-CM

## 2024-06-08 DIAGNOSIS — D64.9 ANEMIA, UNSPECIFIED TYPE: ICD-10-CM

## 2024-06-08 DIAGNOSIS — Z13.6 SCREENING FOR CARDIOVASCULAR CONDITION: ICD-10-CM

## 2024-06-08 DIAGNOSIS — E03.9 HYPOTHYROIDISM, UNSPECIFIED TYPE: ICD-10-CM

## 2024-06-08 LAB
25(OH)D3 SERPL-MCNC: 49 NG/ML (ref 30–100)
BASOPHILS # BLD AUTO: 0.11 X10*3/UL (ref 0–0.1)
BASOPHILS NFR BLD AUTO: 1.3 %
CHOLEST SERPL-MCNC: 106 MG/DL (ref 0–199)
CHOLESTEROL/HDL RATIO: 2.3
CREAT UR-MCNC: 123.1 MG/DL (ref 20–320)
EOSINOPHIL # BLD AUTO: 0.46 X10*3/UL (ref 0–0.7)
EOSINOPHIL NFR BLD AUTO: 5.4 %
ERYTHROCYTE [DISTWIDTH] IN BLOOD BY AUTOMATED COUNT: 12.7 % (ref 11.5–14.5)
HCT VFR BLD AUTO: 41.3 % (ref 36–46)
HDLC SERPL-MCNC: 45.7 MG/DL
HGB BLD-MCNC: 13.6 G/DL (ref 12–16)
IMM GRANULOCYTES # BLD AUTO: 0.11 X10*3/UL (ref 0–0.7)
IMM GRANULOCYTES NFR BLD AUTO: 1.3 % (ref 0–0.9)
LDLC SERPL CALC-MCNC: 39 MG/DL
LYMPHOCYTES # BLD AUTO: 4.18 X10*3/UL (ref 1.2–4.8)
LYMPHOCYTES NFR BLD AUTO: 49.3 %
MCH RBC QN AUTO: 30.9 PG (ref 26–34)
MCHC RBC AUTO-ENTMCNC: 32.9 G/DL (ref 32–36)
MCV RBC AUTO: 94 FL (ref 80–100)
MICROALBUMIN UR-MCNC: 8.9 MG/L
MICROALBUMIN/CREAT UR: 7.2 UG/MG CREAT
MONOCYTES # BLD AUTO: 0.7 X10*3/UL (ref 0.1–1)
MONOCYTES NFR BLD AUTO: 8.3 %
NEUTROPHILS # BLD AUTO: 2.92 X10*3/UL (ref 1.2–7.7)
NEUTROPHILS NFR BLD AUTO: 34.4 %
NON HDL CHOLESTEROL: 60 MG/DL (ref 0–149)
NRBC BLD-RTO: 0 /100 WBCS (ref 0–0)
PLATELET # BLD AUTO: 275 X10*3/UL (ref 150–450)
RBC # BLD AUTO: 4.4 X10*6/UL (ref 4–5.2)
T4 FREE SERPL-MCNC: 1.1 NG/DL (ref 0.61–1.12)
TRIGL SERPL-MCNC: 107 MG/DL (ref 0–149)
TSH SERPL-ACNC: 0.17 MIU/L (ref 0.44–3.98)
VIT B12 SERPL-MCNC: 1123 PG/ML (ref 211–911)
VLDL: 21 MG/DL (ref 0–40)
WBC # BLD AUTO: 8.5 X10*3/UL (ref 4.4–11.3)

## 2024-06-08 PROCEDURE — 84439 ASSAY OF FREE THYROXINE: CPT

## 2024-06-08 PROCEDURE — 82043 UR ALBUMIN QUANTITATIVE: CPT

## 2024-06-08 PROCEDURE — 80061 LIPID PANEL: CPT

## 2024-06-08 PROCEDURE — 82570 ASSAY OF URINE CREATININE: CPT

## 2024-06-08 PROCEDURE — 82306 VITAMIN D 25 HYDROXY: CPT

## 2024-06-08 PROCEDURE — 85025 COMPLETE CBC W/AUTO DIFF WBC: CPT

## 2024-06-08 PROCEDURE — 84443 ASSAY THYROID STIM HORMONE: CPT

## 2024-06-08 PROCEDURE — 82607 VITAMIN B-12: CPT

## 2024-06-08 PROCEDURE — 36415 COLL VENOUS BLD VENIPUNCTURE: CPT

## 2024-06-14 PROCEDURE — RXMED WILLOW AMBULATORY MEDICATION CHARGE

## 2024-06-15 ENCOUNTER — PHARMACY VISIT (OUTPATIENT)
Dept: PHARMACY | Facility: CLINIC | Age: 69
End: 2024-06-15
Payer: COMMERCIAL

## 2024-07-12 DIAGNOSIS — E11.69 TYPE 2 DIABETES MELLITUS WITH OTHER SPECIFIED COMPLICATION, UNSPECIFIED WHETHER LONG TERM INSULIN USE (MULTI): ICD-10-CM

## 2024-07-12 PROCEDURE — RXMED WILLOW AMBULATORY MEDICATION CHARGE

## 2024-07-12 RX ORDER — SEMAGLUTIDE 0.68 MG/ML
0.5 INJECTION, SOLUTION SUBCUTANEOUS
Qty: 3 ML | Refills: 2 | Status: SHIPPED | OUTPATIENT
Start: 2024-07-14

## 2024-07-13 ENCOUNTER — PHARMACY VISIT (OUTPATIENT)
Dept: PHARMACY | Facility: CLINIC | Age: 69
End: 2024-07-13
Payer: COMMERCIAL

## 2024-08-16 PROCEDURE — RXMED WILLOW AMBULATORY MEDICATION CHARGE

## 2024-08-17 ENCOUNTER — PHARMACY VISIT (OUTPATIENT)
Dept: PHARMACY | Facility: CLINIC | Age: 69
End: 2024-08-17
Payer: COMMERCIAL

## 2024-09-19 PROCEDURE — RXMED WILLOW AMBULATORY MEDICATION CHARGE

## 2024-09-20 ENCOUNTER — PHARMACY VISIT (OUTPATIENT)
Dept: PHARMACY | Facility: CLINIC | Age: 69
End: 2024-09-20
Payer: COMMERCIAL

## 2024-10-18 DIAGNOSIS — E11.69 TYPE 2 DIABETES MELLITUS WITH OTHER SPECIFIED COMPLICATION, UNSPECIFIED WHETHER LONG TERM INSULIN USE (MULTI): ICD-10-CM

## 2024-10-18 PROCEDURE — RXMED WILLOW AMBULATORY MEDICATION CHARGE

## 2024-10-18 RX ORDER — SEMAGLUTIDE 0.68 MG/ML
0.5 INJECTION, SOLUTION SUBCUTANEOUS
Qty: 3 ML | Refills: 2 | Status: SHIPPED | OUTPATIENT
Start: 2024-10-20

## 2024-10-21 ENCOUNTER — PHARMACY VISIT (OUTPATIENT)
Dept: PHARMACY | Facility: CLINIC | Age: 69
End: 2024-10-21
Payer: COMMERCIAL

## 2024-11-21 PROCEDURE — RXMED WILLOW AMBULATORY MEDICATION CHARGE

## 2024-11-22 ENCOUNTER — PHARMACY VISIT (OUTPATIENT)
Dept: PHARMACY | Facility: CLINIC | Age: 69
End: 2024-11-22
Payer: COMMERCIAL

## 2024-12-19 ENCOUNTER — TELEMEDICINE (OUTPATIENT)
Dept: PRIMARY CARE | Facility: CLINIC | Age: 69
End: 2024-12-19
Payer: MEDICARE

## 2024-12-19 DIAGNOSIS — U07.1 COVID: Primary | ICD-10-CM

## 2024-12-19 PROCEDURE — 1160F RVW MEDS BY RX/DR IN RCRD: CPT | Performed by: FAMILY MEDICINE

## 2024-12-19 PROCEDURE — 3048F LDL-C <100 MG/DL: CPT | Performed by: FAMILY MEDICINE

## 2024-12-19 PROCEDURE — 4010F ACE/ARB THERAPY RXD/TAKEN: CPT | Performed by: FAMILY MEDICINE

## 2024-12-19 PROCEDURE — 3061F NEG MICROALBUMINURIA REV: CPT | Performed by: FAMILY MEDICINE

## 2024-12-19 PROCEDURE — 1123F ACP DISCUSS/DSCN MKR DOCD: CPT | Performed by: FAMILY MEDICINE

## 2024-12-19 PROCEDURE — 1036F TOBACCO NON-USER: CPT | Performed by: FAMILY MEDICINE

## 2024-12-19 PROCEDURE — 99213 OFFICE O/P EST LOW 20 MIN: CPT | Performed by: FAMILY MEDICINE

## 2024-12-19 PROCEDURE — 1159F MED LIST DOCD IN RCRD: CPT | Performed by: FAMILY MEDICINE

## 2024-12-19 RX ORDER — NIRMATRELVIR AND RITONAVIR 300-100 MG
3 KIT ORAL 2 TIMES DAILY
Qty: 30 TABLET | Refills: 0 | Status: SHIPPED | OUTPATIENT
Start: 2024-12-19 | End: 2024-12-24

## 2024-12-19 RX ORDER — AZITHROMYCIN 250 MG/1
TABLET, FILM COATED ORAL
Qty: 6 TABLET | Refills: 0 | Status: SHIPPED | OUTPATIENT
Start: 2024-12-19 | End: 2024-12-23

## 2024-12-19 NOTE — PROGRESS NOTES
Subjective   Patient ID: Daphney Murcia is a 69 y.o. female who presents for No chief complaint on file..  HPI   This visit was completed via virtual  visit...due to the restrictions of patients schedule. All issues as below were discussed and addressed, but physical examination was limited to visual inspection only and was performed.. IN THIS restricted fashion. iF It was felt that the patient should be evaluated in clinic then  .they were directed there. The patient verbally consented to visit.    HOME  COVID  TEST    POSITIVE  The onset of the cough he has been gradual/side and he has been occurring in an intermittent/persistent pattern for 2  days , The course has been worsening with  sputum daily. The symptoms are associated with runny nose and nasal congestion cough...CLEAR wheezing body aches,, ++loss of appetite,++ fever, ++chills, ++ear pain ++sore throat   ++headache -nausea -vomiting -diarrhea or -dyspnea   Review of Systems  All other  pertinent  systems reviewed and are negative except  those  mentioned  in HPI   Objective   Physical Exam  Physical examination the visual auditory observations  Vital signs none provided by patient  General no acute distress alert and oriented  Head and neck no obvious mass or deformity appreciated no obvious xanthelasma  Lungs no obvious respiratory distress. No audible wheezes noted  Abdomen..Obesity appreciated  Extremities no visual  abnormalties appreciated noted  Neuro. No visually apparent deficits  Psychiatric. Well with normal mood   Labs  CMP   Chloride   Date Value Ref Range Status   11/24/2023 101 98 - 107 mmol/L Final   01/07/2023 106 98 - 107 mmol/L Final   12/03/2022 103 98 - 107 mmol/L Final   01/22/2022 104 98 - 107 mmol/L Final     Creatinine   Date Value Ref Range Status   11/24/2023 0.86 0.50 - 1.05 mg/dL Final   01/07/2023 0.82 0.50 - 1.05 mg/dL Final   12/03/2022 0.81 0.50 - 1.05 mg/dL Final   01/22/2022 0.79 0.50 - 1.05 mg/dL Final     Glucose    Date Value Ref Range Status   11/24/2023 115 (H) 74 - 99 mg/dL Final   01/07/2023 136 (H) 74 - 99 mg/dL Final   12/03/2022 164 (H) 74 - 99 mg/dL Final   01/22/2022 125 (H) 74 - 99 mg/dL Final     Potassium   Date Value Ref Range Status   11/24/2023 4.2 3.5 - 5.3 mmol/L Final   01/07/2023 4.4 3.5 - 5.3 mmol/L Final   12/03/2022 4.2 3.5 - 5.3 mmol/L Final   01/22/2022 4.3 3.5 - 5.3 mmol/L Final     Total Protein   Date Value Ref Range Status   11/24/2023 7.1 6.4 - 8.2 g/dL Final   01/07/2023 7.2 6.4 - 8.2 g/dL Final   01/22/2022 7.5 6.4 - 8.2 g/dL Final   01/04/2022 6.4 6.4 - 8.2 g/dL Final     Sodium   Date Value Ref Range Status   11/24/2023 140 136 - 145 mmol/L Final   01/07/2023 140 136 - 145 mmol/L Final   12/03/2022 139 136 - 145 mmol/L Final   01/22/2022 138 136 - 145 mmol/L Final     Urea Nitrogen   Date Value Ref Range Status   11/24/2023 12 6 - 23 mg/dL Final   01/07/2023 13 6 - 23 mg/dL Final   12/03/2022 13 6 - 23 mg/dL Final   01/22/2022 12 6 - 23 mg/dL Final     Phosphorus   Date Value Ref Range Status   12/03/2022 3.6 2.5 - 4.9 mg/dL Final     Comment:      The performance characteristics of phosphorus testing in   heparinized plasma have been validated by the individual     laboratory site where testing is performed. Testing    on heparinized plasma is not approved by the FDA;    however, such approval is not necessary.       Albumin   Date Value Ref Range Status   11/24/2023 4.3 3.4 - 5.0 g/dL Final   01/07/2023 4.0 3.4 - 5.0 g/dL Final   12/03/2022 4.1 3.4 - 5.0 g/dL Final   01/22/2022 4.1 3.4 - 5.0 g/dL Final     GFR Female   Date Value Ref Range Status   01/07/2023 78 >90 mL/min/1.73m2 Final     Comment:      CALCULATIONS OF ESTIMATED GFR ARE PERFORMED   USING THE 2021 CKD-EPI STUDY REFIT EQUATION   WITHOUT THE RACE VARIABLE FOR THE IDMS-TRACEABLE   CREATININE METHODS.    https://jasn.asnjournals.org/content/early/2021/09/[email or web address]/[email or web address]     12/03/2022 79 >90 mL/min/1.73m2 Final     Comment:       CALCULATIONS OF ESTIMATED GFR ARE PERFORMED   USING THE 2021 CKD-EPI STUDY REFIT EQUATION   WITHOUT THE RACE VARIABLE FOR THE IDMS-TRACEABLE   CREATININE METHODS.    https://jasn.asnjournals.org/content/early/2021/09/[email or web address]/[email or web address]     01/22/2022 82 >90 mL/min/1.73m2 Final     Comment:      CALCULATIONS OF ESTIMATED GFR ARE PERFORMED   USING THE 2021 CKD-EPI STUDY REFIT EQUATION   WITHOUT THE RACE VARIABLE FOR THE IDMS-TRACEABLE   CREATININE METHODS.    https://jasn.asnjournals.org/content/early/2021/09/[email or web address]/[email or web address]           Assessment/Plan   Problem List Items Addressed This Visit       COVID - Primary    Relevant Medications    nirmatrelvir-ritonavir (Paxlovid) 300 mg (150 mg x 2)-100 mg tablet therapy pack    azithromycin (Zithromax) 250 mg tablet    Other Relevant Orders    Basic Metabolic Panel

## 2024-12-19 NOTE — PATIENT INSTRUCTIONS
Please stop atorvastatin while taking Paxlovid and stay off medication for additional 5 days after completing treatment okay and I think it is nonsecure I think you fine  Sent      Rapid COVID- POSITIVE      Adhere to CDC guidelines and stay home for at least 2-5 days and isolate from others in your home.   You are likely most infectious during these first 5 days, but new guidelines state if you are fever free w/o fever reducing medications you can go out in public with a tight fitting mask for at least 5 days   Wear a high-quality mask if you must be around others at home and in public.   Do not go places where you are unable to wear a mask. For travel guidance, see CDC’s Travel webpage.   Do not travel.   Stay home and separate from others as much as possible.   Use a separate bathroom, if possible.   Take steps to improve ventilation at home, if possible.   Don’t share personal household items, like cups, towels, and utensils.   Monitor your symptoms. If you have an emergency warning sign (like trouble breathing), seek emergency medical care immediately.      Learn more about what to do if you have COVID-19.   You do not need to retest after testing positive. Instead, you may end your isolation after five days if you feel better and are fever-free for 24 hours without using a fever-reducing medicine. You are most infectious during those five days, but the CDC advises wearing a high-quality mask through day 10.5   Still, if you decide to retest, a rapid antigen test will likely yield the most accurate results for ending your isolation.   You may need to test again within three months of a positive COVID-19 test, such as for travel. The CDC advises using a rapid antigen test instead of a PCR test if it's been less than three months since you had COVID-19.2         Managing COVID-19 symptoms         While recovering at home, keep track of your symptoms. Follow your provider's instructions and take medicines as  prescribed. If you have severe symptoms, call 911 or the local emergency number.      To help manage symptoms of COVID-19, try the following tips.      Rest and drink plenty of fluids.      Acetaminophen (Tylenol) and ibuprofen (Advil, Motrin) help reduce fever. Sometimes, providers advise you to use both types of medicine. Take the recommended amount to reduce fever. DO NOT use ibuprofen in children 6 months or younger.      A lukewarm bath or sponge bath may help cool a fever. Keep taking medicine -- otherwise your temperature might go back up.      For a sore throat, gargle several times a day with warm salt water (1/2 tsp or 3 grams of salt in 1 cup or 240 milliliters of water). Drink warm liquids such as tea, or lemon tea with honey. Suck on hard candies or throat lozenges.      Use a vaporizer or take a steamy shower to increase moisture in the air, reduce nasal congestion, and help soothe a dry throat and cough.      Saline spray can also help reduce nasal congestion.      To help relieve diarrhea, drink 8 to 10 glasses of clear liquids, such as water, diluted fruit juices, and clear soups to make up for fluid loss. Avoid dairy products, fried foods, caffeine, alcohol, and carbonated drinks.      If you have nausea, eat small meals with bland foods. Avoid foods with strong smells. Try to drink 8 to 10 glasses of water or clear fluids every day to stay hydrated.      Do not smoke, and stay away from secondhand smoke.       HOME CARE INSTRUCTIONS:      --- Drink plenty of water. Water helps thin the mucus so your sinuses can drain more easily.      --- Use a humidifier.      --- Inhale steam 3 to 4 times a day (for example, sit in the bathroom with the shower running).      --- Apply a warm, moist washcloth to your face 3 to 4 times a day, or as directed by your caregiver.      --- Take over-the-counter or prescription medicines for pain, discomfort, or fever only as directed by your caregiver.      SEEK  FURTHER TREATMENT IF YOU:      --- You have increasing pain or severe headaches.      --- You have nausea, vomiting, or drowsiness.      --- You have swelling around your face.      --- You have vision problems.      --- You have a stiff neck.      --- You have difficulty breathing.      Orders and Diagnoses  Diagnoses and all orders for this visit:  COVID  Flu-like symptoms  -     POCT Covid-19 Rapid Antigen

## 2024-12-26 ENCOUNTER — APPOINTMENT (OUTPATIENT)
Dept: PRIMARY CARE | Facility: CLINIC | Age: 69
End: 2024-12-26
Payer: MEDICARE

## 2024-12-26 VITALS
BODY MASS INDEX: 25.48 KG/M2 | TEMPERATURE: 97 F | HEART RATE: 73 BPM | HEIGHT: 70 IN | SYSTOLIC BLOOD PRESSURE: 128 MMHG | WEIGHT: 178 LBS | OXYGEN SATURATION: 97 % | RESPIRATION RATE: 18 BRPM | DIASTOLIC BLOOD PRESSURE: 76 MMHG

## 2024-12-26 DIAGNOSIS — E03.9 HYPOTHYROIDISM, UNSPECIFIED TYPE: ICD-10-CM

## 2024-12-26 DIAGNOSIS — I10 PRIMARY HYPERTENSION: ICD-10-CM

## 2024-12-26 DIAGNOSIS — E11.9 TYPE 2 DIABETES MELLITUS WITHOUT COMPLICATION, UNSPECIFIED WHETHER LONG TERM INSULIN USE (MULTI): Primary | ICD-10-CM

## 2024-12-26 LAB — POC HEMOGLOBIN A1C: 6.1 % (ref 4.2–6.5)

## 2024-12-26 NOTE — ASSESSMENT & PLAN NOTE
Patient is also here for follow-up of hypertension.. Symptoms do not include headache confusion visual disturbance dizziness shortness of breath chest pain syncope or palpitations. Recent blood pressure patient has not  been checking. By report there is good compliance with treatment. Pertinent medical history includes diabetes/hyperlipidemia stable and continue meds

## 2024-12-26 NOTE — PROGRESS NOTES
Subjective   Patient ID: Daphney Murcia is a 69 y.o. female who presents for Follow-up.  HPI  Patient is also here for follow-up of hypertension.. Symptoms do not include headache confusion visual disturbance dizziness shortness of breath chest pain syncope or palpitations. Recent blood pressure patient has not  been checking. By report there is good compliance with treatment. Pertinent medical history includes diabetes/hyperlipidemia     .Patient is here for a recheck of diabetes.hgba1c  was       checks his blood sugars.  once beth.ly . Highest sugar was 130   low sugar.. 98   remains compliant on his medications.,,, Hypoglycemia has occurred rarely or never.... Symptoms do not include polydipsia, polyuria, blurred vision, numbness and tingling in the toes, increased appetite,   weight loss,   at a standstill..   Patient is also here for follow-up of hyperlipidemia. The most recent measurements for this patient would take it on.. 6/8/24 At that time.. Total cholesterol.106... HDL  45  LDL 39   triglycerides  107     .... Associated symptoms do not include chest pain, Cramps with exertion, myalgias or nausea. Current treatment includes statins. By report there is good compliance with treatment. Pertinent medical history includes diabetes and hypertension   HERE FOR RECHK OF THYROIDLast clinic visit was month (S) ago. Symptoms do not include weight gain, cold intolerance, fatigue, weakness, appetite, hair loss, dry skin    There is no known event that preceded symptom onset.  . Current treatment includes levothyroxine... she dropped down  to one pill weekly      Review of Systems  All other  pertinent  systems reviewed and are negative except  those  mentioned  in HPI   Objective   Physical Exam  Vitals: I have reviewed the vitals  General: Well-developed.  In no acute distress.  Eyes:   sclera nonicteric.  Conjunctiva not injected.  No discharge.   HEAD: Normocephalic, atraumatic.  HEENT   Mucous membranes moist.   Posterior oropharynx nonerythematous, no tonsillar exudates.      No cervical lymphadenopathy.  Cardio: Regular rate and rhythm.  No murmur, rub or gallop.  Pulmonary: Lungs clear to auscultation in all fields.  No accessory muscle use.  GI/: Normal active bowel sounds.  Soft, nontender.  No masses or organomegaly appreciated.  Musculoskeletal: No gross deformities appreciated.  Neuro: Alert, age-appropriate.  Normal muscle tone.  Moving all extremities.  Skin: No rash, bruises or lesions. n  Labs        Assessment/Plan   Problem List Items Addressed This Visit       Hypothyroidism     HERE FOR RECHK OF THYROIDLast clinic visit was month (S) ago. Symptoms do not include weight gain, cold intolerance, fatigue, weakness, appetite, hair loss, dry skin    There is no known event that preceded symptom onset.  . Current treatment includes levothyroxine... she dropped down  to one pill weekly advised vtro take  6 days weeklyrechk  t  4 tsh    in     8  weeks          Relevant Orders    Thyroxine, Free    Thyroid Stimulating Hormone    Hypertension     Patient is also here for follow-up of hypertension.. Symptoms do not include headache confusion visual disturbance dizziness shortness of breath chest pain syncope or palpitations. Recent blood pressure patient has not  been checking. By report there is good compliance with treatment. Pertinent medical history includes diabetes/hyperlipidemia stable and continue meds          Relevant Orders    Comprehensive Metabolic Panel    Diabetes mellitus (Multi) - Primary     .Patient is here for a recheck of diabetes.hgba1c  was   6.1    checks his blood sugars.  once beth.ly . Highest sugar was 130   low sugar.. 98   remains compliant on his medications.,,, Hypoglycemia has occurred rarely or never.... Symptoms do not include polydipsia, polyuria, blurred vision, numbness and tingling in the toes, increased appetite,   weight loss,   at a standstill.. stable and continue meds           Relevant Orders    POCT glycosylated hemoglobin (Hb A1C) manually resulted (Completed)    Thyroxine, Free    CBC and Auto Differential    Lipid Panel    Diabetic Retinopathy Luminetics (Completed)

## 2024-12-26 NOTE — PATIENT INSTRUCTIONS

## 2024-12-26 NOTE — ASSESSMENT & PLAN NOTE
HERE FOR RECHK OF THYROIDLast clinic visit was month (S) ago. Symptoms do not include weight gain, cold intolerance, fatigue, weakness, appetite, hair loss, dry skin    There is no known event that preceded symptom onset.  . Current treatment includes levothyroxine... she dropped down  to one pill weekly advised vtro take  6 days weeklyrechk  t  4 tsh    in     8  weeks

## 2024-12-26 NOTE — ASSESSMENT & PLAN NOTE
.Patient is here for a recheck of diabetes.hgba1c  was   6.1    checks his blood sugars.  once beth.ly . Highest sugar was 130   low sugar.. 98   remains compliant on his medications.,,, Hypoglycemia has occurred rarely or never.... Symptoms do not include polydipsia, polyuria, blurred vision, numbness and tingling in the toes, increased appetite,   weight loss,   at a standstill.. stable and continue meds

## 2024-12-27 ENCOUNTER — PHARMACY VISIT (OUTPATIENT)
Dept: PHARMACY | Facility: CLINIC | Age: 69
End: 2024-12-27
Payer: COMMERCIAL

## 2024-12-27 DIAGNOSIS — E11.69 TYPE 2 DIABETES MELLITUS WITH OTHER SPECIFIED COMPLICATION, UNSPECIFIED WHETHER LONG TERM INSULIN USE (MULTI): ICD-10-CM

## 2024-12-27 PROCEDURE — RXMED WILLOW AMBULATORY MEDICATION CHARGE

## 2024-12-30 RX ORDER — METFORMIN HYDROCHLORIDE 1000 MG/1
1000 TABLET ORAL 2 TIMES DAILY
Qty: 180 TABLET | Refills: 3 | Status: SHIPPED | OUTPATIENT
Start: 2024-12-30

## 2024-12-30 RX ORDER — ATORVASTATIN CALCIUM 20 MG/1
20 TABLET, FILM COATED ORAL DAILY
Qty: 90 TABLET | Refills: 3 | Status: SHIPPED | OUTPATIENT
Start: 2024-12-30

## 2025-01-06 ENCOUNTER — HOSPITAL ENCOUNTER (OUTPATIENT)
Dept: RADIOLOGY | Facility: HOSPITAL | Age: 70
Discharge: HOME | End: 2025-01-06
Payer: MEDICARE

## 2025-01-06 VITALS — BODY MASS INDEX: 25.48 KG/M2 | HEIGHT: 70 IN | WEIGHT: 178 LBS

## 2025-01-06 DIAGNOSIS — Z12.31 ENCOUNTER FOR SCREENING MAMMOGRAM FOR BREAST CANCER: ICD-10-CM

## 2025-01-06 PROCEDURE — 77067 SCR MAMMO BI INCL CAD: CPT | Performed by: RADIOLOGY

## 2025-01-06 PROCEDURE — 77067 SCR MAMMO BI INCL CAD: CPT

## 2025-01-06 PROCEDURE — 77063 BREAST TOMOSYNTHESIS BI: CPT | Performed by: RADIOLOGY

## 2025-01-23 DIAGNOSIS — E03.9 HYPOTHYROIDISM, UNSPECIFIED TYPE: ICD-10-CM

## 2025-01-23 DIAGNOSIS — I10 HYPERTENSION, UNSPECIFIED TYPE: ICD-10-CM

## 2025-01-24 RX ORDER — LEVOTHYROXINE SODIUM 100 UG/1
100 TABLET ORAL DAILY
Qty: 90 TABLET | Refills: 0 | Status: SHIPPED | OUTPATIENT
Start: 2025-01-24

## 2025-01-24 RX ORDER — LISINOPRIL 5 MG/1
5 TABLET ORAL DAILY
Qty: 90 TABLET | Refills: 0 | Status: SHIPPED | OUTPATIENT
Start: 2025-01-24

## 2025-02-03 ENCOUNTER — TELEMEDICINE (OUTPATIENT)
Dept: PRIMARY CARE | Facility: CLINIC | Age: 70
End: 2025-02-03
Payer: MEDICARE

## 2025-02-03 DIAGNOSIS — J01.00 ACUTE MAXILLARY SINUSITIS, RECURRENCE NOT SPECIFIED: Primary | ICD-10-CM

## 2025-02-03 DIAGNOSIS — R68.89 FLU-LIKE SYMPTOMS: ICD-10-CM

## 2025-02-03 DIAGNOSIS — E11.69 TYPE 2 DIABETES MELLITUS WITH OTHER SPECIFIED COMPLICATION, UNSPECIFIED WHETHER LONG TERM INSULIN USE (MULTI): ICD-10-CM

## 2025-02-03 PROBLEM — J32.9 SINUSITIS: Status: ACTIVE | Noted: 2025-02-03

## 2025-02-03 PROCEDURE — 1123F ACP DISCUSS/DSCN MKR DOCD: CPT | Performed by: FAMILY MEDICINE

## 2025-02-03 PROCEDURE — 4010F ACE/ARB THERAPY RXD/TAKEN: CPT | Performed by: FAMILY MEDICINE

## 2025-02-03 PROCEDURE — 99213 OFFICE O/P EST LOW 20 MIN: CPT | Performed by: FAMILY MEDICINE

## 2025-02-03 RX ORDER — OSELTAMIVIR PHOSPHATE 75 MG/1
75 CAPSULE ORAL EVERY 24 HOURS
Qty: 14 CAPSULE | Refills: 0 | Status: SHIPPED | OUTPATIENT
Start: 2025-02-03 | End: 2025-02-17

## 2025-02-03 ASSESSMENT — ENCOUNTER SYMPTOMS
HEADACHES: 1
MYALGIAS: 0
HEMOPTYSIS: 0
CHILLS: 0
COUGH: 1
FEVER: 1
FEVER: 0
HEARTBURN: 0
RHINORRHEA: 1

## 2025-02-03 NOTE — ASSESSMENT & PLAN NOTE
In light of duration of symptoms that is x 2 weeks but now with fever x 4 days lets start doxycycline for presumptive infection.  In addition however since recently exposed to flu A i.e.  and other family members will start 14-day course of Tamiflu times once daily consider elderberry/vitamin C increase fluids

## 2025-02-03 NOTE — PROGRESS NOTES
Subjective   Patient ID: Daphney Murcia is a 69 y.o. female who presents for No chief complaint on file..  This visit was completed via virtual  visit...due to the restrictions of patients schedule. All issues as below were discussed and addressed, but physical examination was limited to visual inspection only and was performed.. IN THIS restricted fashion. iF It was felt that the patient should be evaluated in clinic then  .they were directed there. The patient verbally consented to visit.    Cough  This is a new problem. The current episode started 1 to 4 weeks ago. The problem has been gradually improving. The problem occurs every few hours. The cough is Non-productive. Associated symptoms include a fever, headaches, nasal congestion and rhinorrhea. Pertinent negatives include no chest pain, chills, ear congestion, ear pain, heartburn, hemoptysis or myalgias. Nothing aggravates the symptoms. Risk factors for lung disease include occupational exposure.     Exposed to flu  Slight cough  and some congestion      and off  and  on for  two weeks  Maybe even  improving  Review of Systems   Constitutional:  Positive for fever. Negative for chills.   HENT:  Positive for rhinorrhea. Negative for ear pain.    Respiratory:  Positive for cough. Negative for hemoptysis.    Cardiovascular:  Negative for chest pain.   Gastrointestinal:  Negative for heartburn.   Musculoskeletal:  Negative for myalgias.   Neurological:  Positive for headaches.       Objective   Physical Exam  Physical examination the visual auditory observations  Vital signs .. Pulse ox is  ok 100.4  pulse  61 v  General no acute distress alert and oriented  Head and neck no obvious mass or deformity appreciated no obvious xanthelasma  Lungs no obvious respiratory distress. No audible wheezes noted  Abdomen..Obesity appreciated  Extremities no visual  abnormalties appreciated noted  Neuro. No visually apparent deficits  Psychiatric. Well with normal mood    Labs        Assessment/Plan   Problem List Items Addressed This Visit       Sinusitis - Primary     Lets try doxycycline in light of fever 100 mg twice daily x 7 days         Flu-like symptoms     In light of duration of symptoms that is x 2 weeks but now with fever x 4 days lets start doxycycline for presumptive infection.  In addition however since recently exposed to flu A i.e.  and other family members will start 14-day course of Tamiflu times once daily consider elderberry/vitamin C increase fluids         Relevant Medications    oseltamivir (Tamiflu) 75 mg capsule

## 2025-02-04 RX ORDER — SEMAGLUTIDE 0.68 MG/ML
0.5 INJECTION, SOLUTION SUBCUTANEOUS
Qty: 3 ML | Refills: 2 | Status: SHIPPED | OUTPATIENT
Start: 2025-02-09

## 2025-02-05 PROCEDURE — RXMED WILLOW AMBULATORY MEDICATION CHARGE

## 2025-02-08 ENCOUNTER — PHARMACY VISIT (OUTPATIENT)
Dept: PHARMACY | Facility: CLINIC | Age: 70
End: 2025-02-08
Payer: COMMERCIAL

## 2025-02-17 DIAGNOSIS — G25.81 RESTLESS LEGS SYNDROME: ICD-10-CM

## 2025-02-17 RX ORDER — ROPINIROLE 1 MG/1
1 TABLET, FILM COATED ORAL NIGHTLY
Qty: 90 TABLET | Refills: 1 | Status: SHIPPED | OUTPATIENT
Start: 2025-02-17

## 2025-03-27 ENCOUNTER — PHARMACY VISIT (OUTPATIENT)
Dept: PHARMACY | Facility: CLINIC | Age: 70
End: 2025-03-27
Payer: COMMERCIAL

## 2025-03-27 PROCEDURE — RXMED WILLOW AMBULATORY MEDICATION CHARGE

## 2025-04-01 DIAGNOSIS — I10 HYPERTENSION, UNSPECIFIED TYPE: ICD-10-CM

## 2025-04-01 DIAGNOSIS — E03.9 HYPOTHYROIDISM, UNSPECIFIED TYPE: ICD-10-CM

## 2025-04-01 RX ORDER — LISINOPRIL 5 MG/1
5 TABLET ORAL DAILY
Qty: 90 TABLET | Refills: 3 | Status: SHIPPED | OUTPATIENT
Start: 2025-04-01

## 2025-04-01 RX ORDER — LEVOTHYROXINE SODIUM 100 UG/1
100 TABLET ORAL DAILY
Qty: 90 TABLET | Refills: 3 | Status: SHIPPED | OUTPATIENT
Start: 2025-04-01

## 2025-05-04 DIAGNOSIS — E11.69 TYPE 2 DIABETES MELLITUS WITH OTHER SPECIFIED COMPLICATION, UNSPECIFIED WHETHER LONG TERM INSULIN USE (MULTI): ICD-10-CM

## 2025-05-04 DIAGNOSIS — G56.01 ACUTE CARPAL TUNNEL SYNDROME, RIGHT: ICD-10-CM

## 2025-05-05 PROCEDURE — RXMED WILLOW AMBULATORY MEDICATION CHARGE

## 2025-05-05 RX ORDER — MELOXICAM 15 MG/1
15 TABLET ORAL DAILY
Qty: 90 TABLET | Refills: 0 | Status: SHIPPED | OUTPATIENT
Start: 2025-05-05

## 2025-05-05 RX ORDER — BLOOD-GLUCOSE METER
EACH MISCELLANEOUS
Qty: 200 STRIP | Refills: 3 | Status: SHIPPED | OUTPATIENT
Start: 2025-05-05

## 2025-05-09 ENCOUNTER — PHARMACY VISIT (OUTPATIENT)
Dept: PHARMACY | Facility: CLINIC | Age: 70
End: 2025-05-09
Payer: COMMERCIAL

## 2025-06-03 DIAGNOSIS — E11.69 TYPE 2 DIABETES MELLITUS WITH OTHER SPECIFIED COMPLICATION, UNSPECIFIED WHETHER LONG TERM INSULIN USE (MULTI): ICD-10-CM

## 2025-06-03 RX ORDER — SEMAGLUTIDE 0.68 MG/ML
0.5 INJECTION, SOLUTION SUBCUTANEOUS
Qty: 3 ML | Refills: 2 | Status: SHIPPED | OUTPATIENT
Start: 2025-06-03

## 2025-06-04 PROCEDURE — RXMED WILLOW AMBULATORY MEDICATION CHARGE

## 2025-06-07 ENCOUNTER — PHARMACY VISIT (OUTPATIENT)
Dept: PHARMACY | Facility: CLINIC | Age: 70
End: 2025-06-07
Payer: COMMERCIAL

## 2025-06-25 ENCOUNTER — APPOINTMENT (OUTPATIENT)
Dept: PRIMARY CARE | Facility: CLINIC | Age: 70
End: 2025-06-25
Payer: MEDICARE

## 2025-06-25 VITALS
DIASTOLIC BLOOD PRESSURE: 77 MMHG | RESPIRATION RATE: 18 BRPM | HEART RATE: 71 BPM | WEIGHT: 180 LBS | HEIGHT: 70 IN | SYSTOLIC BLOOD PRESSURE: 126 MMHG | BODY MASS INDEX: 25.77 KG/M2 | OXYGEN SATURATION: 95 % | TEMPERATURE: 97 F

## 2025-06-25 DIAGNOSIS — E11.9 TYPE 2 DIABETES MELLITUS WITHOUT COMPLICATION, UNSPECIFIED WHETHER LONG TERM INSULIN USE: Primary | ICD-10-CM

## 2025-06-25 DIAGNOSIS — Z00.00 ROUTINE GENERAL MEDICAL EXAMINATION AT HEALTH CARE FACILITY: ICD-10-CM

## 2025-06-25 DIAGNOSIS — Z12.31 ENCOUNTER FOR SCREENING MAMMOGRAM FOR BREAST CANCER: ICD-10-CM

## 2025-06-25 DIAGNOSIS — S39.012A SACROILIAC STRAIN, INITIAL ENCOUNTER: ICD-10-CM

## 2025-06-25 DIAGNOSIS — E13.69 OTHER SPECIFIED DIABETES MELLITUS WITH OTHER SPECIFIED COMPLICATION, UNSPECIFIED WHETHER LONG TERM INSULIN USE (MULTI): ICD-10-CM

## 2025-06-25 DIAGNOSIS — E11.69 TYPE 2 DIABETES MELLITUS WITH OTHER SPECIFIED COMPLICATION, UNSPECIFIED WHETHER LONG TERM INSULIN USE (MULTI): ICD-10-CM

## 2025-06-25 DIAGNOSIS — I10 HYPERTENSION, UNSPECIFIED TYPE: ICD-10-CM

## 2025-06-25 DIAGNOSIS — Z13.220 SCREENING FOR HYPERLIPIDEMIA: ICD-10-CM

## 2025-06-25 DIAGNOSIS — Z78.0 ASYMPTOMATIC MENOPAUSAL STATE: ICD-10-CM

## 2025-06-25 DIAGNOSIS — Z13.1 DIABETES MELLITUS SCREENING: ICD-10-CM

## 2025-06-25 LAB — POC HEMOGLOBIN A1C: 6.3 % (ref 4.2–6.5)

## 2025-06-25 PROCEDURE — 3008F BODY MASS INDEX DOCD: CPT | Performed by: FAMILY MEDICINE

## 2025-06-25 PROCEDURE — G0439 PPPS, SUBSEQ VISIT: HCPCS | Performed by: FAMILY MEDICINE

## 2025-06-25 PROCEDURE — 3074F SYST BP LT 130 MM HG: CPT | Performed by: FAMILY MEDICINE

## 2025-06-25 PROCEDURE — 4010F ACE/ARB THERAPY RXD/TAKEN: CPT | Performed by: FAMILY MEDICINE

## 2025-06-25 PROCEDURE — 1160F RVW MEDS BY RX/DR IN RCRD: CPT | Performed by: FAMILY MEDICINE

## 2025-06-25 PROCEDURE — 1170F FXNL STATUS ASSESSED: CPT | Performed by: FAMILY MEDICINE

## 2025-06-25 PROCEDURE — 1159F MED LIST DOCD IN RCRD: CPT | Performed by: FAMILY MEDICINE

## 2025-06-25 PROCEDURE — 83036 HEMOGLOBIN GLYCOSYLATED A1C: CPT | Performed by: FAMILY MEDICINE

## 2025-06-25 PROCEDURE — 3078F DIAST BP <80 MM HG: CPT | Performed by: FAMILY MEDICINE

## 2025-06-25 PROCEDURE — 3044F HG A1C LEVEL LT 7.0%: CPT | Performed by: FAMILY MEDICINE

## 2025-06-25 PROCEDURE — 1036F TOBACCO NON-USER: CPT | Performed by: FAMILY MEDICINE

## 2025-06-25 PROCEDURE — 99214 OFFICE O/P EST MOD 30 MIN: CPT | Performed by: FAMILY MEDICINE

## 2025-06-25 ASSESSMENT — PATIENT HEALTH QUESTIONNAIRE - PHQ9
SUM OF ALL RESPONSES TO PHQ9 QUESTIONS 1 AND 2: 0
2. FEELING DOWN, DEPRESSED OR HOPELESS: NOT AT ALL
1. LITTLE INTEREST OR PLEASURE IN DOING THINGS: NOT AT ALL

## 2025-06-25 ASSESSMENT — ACTIVITIES OF DAILY LIVING (ADL)
TAKING_MEDICATION: INDEPENDENT
DRESSING: INDEPENDENT
GROCERY_SHOPPING: INDEPENDENT
MANAGING_FINANCES: INDEPENDENT
DOING_HOUSEWORK: INDEPENDENT
BATHING: INDEPENDENT

## 2025-06-25 ASSESSMENT — ENCOUNTER SYMPTOMS
OCCASIONAL FEELINGS OF UNSTEADINESS: 0
DEPRESSION: 0
LOSS OF SENSATION IN FEET: 0

## 2025-06-25 NOTE — PROGRESS NOTES
Subjective   Daphney Murcia is a 69 y.o. female who is here for a routine exam/Medicare Wellness Visit  Active Problem List      Comprehensive Medical/Surgical/Social/Family History  Medical History[1]  Surgical History[2]  Social History     Social History Narrative    Not on file     Social History     Socioeconomic History    Marital status:      Spouse name: Not on file    Number of children: Not on file    Years of education: Not on file    Highest education level: Not on file   Occupational History    Not on file   Tobacco Use    Smoking status: Never    Smokeless tobacco: Never   Vaping Use    Vaping status: Never Used   Substance and Sexual Activity    Alcohol use: Not Currently    Drug use: Never    Sexual activity: Yes     Partners: Male     Birth control/protection: Other   Other Topics Concern    Not on file   Social History Narrative    Not on file     Social Drivers of Health     Financial Resource Strain: Not on file   Food Insecurity: Not on file   Transportation Needs: Not on file   Physical Activity: Not on file   Stress: Not on file   Social Connections: Not on file   Intimate Partner Violence: Not on file   Housing Stability: Not on file    Family History[3]     Allergies and Medications  Codeine, Penicillins, and Docusate  Medications Ordered Prior to Encounter[4] Continue to monitor  New Concerns:  Patient is also here for follow-up of hypertension.. Symptoms do not include headache confusion visual disturbance dizziness shortness of breath chest pain syncope or palpitations. Recent blood pressure patient has   been checking... 122/70.  With Ozempic and decrease in blood pressure lisinopril was stopped... Due to lightheadedness and patient feels lightheadedness resolved and no longer present.  Remains normotensive.  Patient is here for a recheck of diabetes.hgba1c  was 6.3       checks his blood sugars.  once beth.ly . Highest sugar was 203   low sugar.. 80   remains compliant on his  "medications.,,, Hypoglycemia has occurred rarely or never.... Symptoms do not include polydipsia, polyuria, blurred vision, numbness and tingling in the toes, increased appetite, weight gain,  infections or foot problems.. baby aspirin   81mg.....denies ...    eye exam 9/24     weight loss of 40  pounds..     feet problems.... hammer toe  right foot  toe        Si   pain  off  andon for 6 mnths     and has gotten worse   Lifestyle  Diet:  Could be improved    Physical Activity: Not on file      Tobacco Use: Low Risk  (6/25/2025)    Patient History     Smoking Tobacco Use: Never     Smokeless Tobacco Use: Never     Passive Exposure: Not on file      Alcohol Use: Not on file      Depression: Not at risk (6/25/2025)    PHQ-2     PHQ-2 Score: 0      Stress: Not on file       Consultants:  Patient Care Team:  Nicola Lee DO as PCP - General   Gyn   blanca gomez    Colorectal Screening:   colonoscopy  Date: 1/3/23 repeat 2033    Breast Screening:   Due - Ordered today6/24  History of abnormal mammogram: no  Family history of breast cancer: no    Abnormal uterine bleeding: -  Urinary incontinence: -    Dexa Scan: Up to date    Review of Systems  All other  pertinent  systems reviewed and are negative except  those  mentioned  in HPI   Objective   /77   Pulse 71   Temp 36.1 °C (97 °F)   Resp 18   Ht 1.778 m (5' 10\")   Wt 81.6 kg (180 lb)   SpO2 95%   BMI 25.83 kg/m²     Physical Exam  Vitals: I have reviewed the vitals  General: Well-developed.  In no acute distress.  Eyes:   sclera nonicteric.  Conjunctiva not injected.  No discharge.   HEAD: Normocephalic, atraumatic.  HEENT   Mucous membranes moist.  Posterior oropharynx nonerythematous, no tonsillar exudates.      No cervical lymphadenopathy.  Cardio: Regular rate and rhythm.  No murmur, rub or gallop.  Pulmonary: Lungs clear to auscultation in all fields.  No accessory muscle use.  GI/: Normal active bowel sounds.  Soft, nontender.  No masses or " organomegaly appreciated.  Musculoskeletal: No gross deformities appreciated.  Neuro: Alert, age-appropriate.  Normal muscle tone.  Moving all extremities.  Skin: No rash, bruises or lesions.   Assessment/Plan   Problem List Items Addressed This Visit       Other specified diabetes mellitus with other specified complication, unspecified whether long term insulin use (Multi) - Primary    Type 2 diabetes mellitus with other specified complication, unspecified whether long term insulin use (Multi)    Patient is here for a recheck of diabetes.hgba1c  was 6.3       checks his blood sugars.  once beth.ly . Highest sugar was 203   low sugar.. 80   remains compliant on his medications.,,, Hypoglycemia has occurred rarely or never.... Symptoms do not include polydipsia, polyuria, blurred vision, numbness and tingling in the toes, increased appetite, weight gain,  infections or foot problems.. baby aspirin   81mg.....denies ...    eye exam 9/24     weight loss of 40  pounds..     feet problems.... hammer toe  right foot  toe     stable and continue meds          Relevant Orders    CBC and Auto Differential    Sacroiliac strain    Lets check x-rays.  Try ice or heat/extra-strength Tylenol every 12 hours 2 tablets 500 mg see if strengthening helps if persistent transition to stand-up desk and if fails to improve call for PT referral         Relevant Orders    XR pelvis 1-2 views    XR lumbar spine 2-3 views     Other Visit Diagnoses         Routine general medical examination at health care facility        Relevant Orders    1 Year Follow Up In [facility] - PCP - Wellness Exam    Comprehensive Metabolic Panel      Diabetes mellitus screening        Relevant Orders    Urine Microalbumin - Lab collect      Asymptomatic menopausal state        Relevant Orders    XR DEXA bone density      Encounter for screening mammogram for breast cancer        Relevant Orders    BI mammo bilateral screening tomosynthesis      Screening  for hyperlipidemia        Relevant Orders    Lipid Panel            Reviewed Social Determinants of health with patient, discussed healthy lifestyle including 150 minutes of physical activity per week  Ordered/Reviewed baseline labwork -CBC, CMP, Lipid Panel  Immunizations: Recommended RSV shot  Mammogram get  6/25  Dexa 11/23  will repeat   Colorectal Screen repeat 2033    Health Counseling                  [1]   Past Medical History:  Diagnosis Date    Diabetes mellitus (Multi)     Encounter for screening for other viral diseases 07/20/2021    Need for hepatitis C screening test    Headache     Hypertension     Other chest pain     Chest tightness    Personal history of other medical treatment 06/17/2022    History of screening mammography    Varicella    [2]   Past Surgical History:  Procedure Laterality Date    CHOLECYSTECTOMY      OTHER SURGICAL HISTORY  10/10/2019    Cholecystectomy    OTHER SURGICAL HISTORY  12/07/2020    Mid-urethral sling insertion    WISDOM TOOTH EXTRACTION     [3]   Family History  Problem Relation Name Age of Onset    Diabetes Mother Dolly     Other (atrial septal defect) Mother Dolly     Heart disease Father Spenser     Coronary artery disease Father Spenser     Pulmonary embolism Father Spenser     Diabetes Brother      Diabetes Other     [4]   Current Outpatient Medications on File Prior to Visit   Medication Sig Dispense Refill    alendronate (Fosamax) 70 mg tablet Take 1 tablet (70 mg) by mouth every 7 days. Take in the morning with a full glass of water, on an empty stomach, and do not take anything else by mouth or lie down for the next 30 min. 4 tablet 11    atorvastatin (Lipitor) 20 mg tablet TAKE 1 TABLET BY MOUTH ONCE  DAILY 90 tablet 3    blood sugar diagnostic (OneTouch Verio test strips) USE TO TEST BLOOD SUGAR 1 TO 2  TIMES DAILY 200 strip 3    glimepiride (Amaryl) 4 mg tablet Take 1 tablet (4 mg) by mouth once daily in the morning. Take before meals. 90 tablet 3    lancets  misc 1 Act once daily, THEN 1 Act once daily. Use as directed. 90 each 3    levothyroxine (Synthroid, Levoxyl) 100 mcg tablet TAKE 1 TABLET BY MOUTH ONCE  DAILY 90 tablet 3    lisinopril 5 mg tablet TAKE 1 TABLET BY MOUTH ONCE  DAILY 90 tablet 3    magnesium oxide (Mag-Ox) 400 mg (241.3 mg magnesium) tablet Take by mouth.      meloxicam (Mobic) 15 mg tablet TAKE 1 TABLET BY MOUTH ONCE  DAILY 90 tablet 0    metFORMIN (Glucophage) 1,000 mg tablet TAKE 1 TABLET BY MOUTH TWICE  DAILY 180 tablet 3    rOPINIRole (Requip) 1 mg tablet TAKE ONE TABLET BY MOUTH ONCE DAILY AT BEDTIME. 90 tablet 1    semaglutide (Ozempic) 0.25 mg or 0.5 mg (2 mg/3 mL) pen injector Inject 0.5 mg under the skin 1 (one) time per week. 3 mL 2     No current facility-administered medications on file prior to visit.

## 2025-06-25 NOTE — ASSESSMENT & PLAN NOTE
Lets check x-rays.  Try ice or heat/extra-strength Tylenol every 12 hours 2 tablets 500 mg see if strengthening helps if persistent transition to stand-up desk and if fails to improve call for PT referral

## 2025-06-25 NOTE — ASSESSMENT & PLAN NOTE
Patient is here for a recheck of diabetes.hgba1c  was 6.3       checks his blood sugars.  once beth.ly . Highest sugar was 203   low sugar.. 80   remains compliant on his medications.,,, Hypoglycemia has occurred rarely or never.... Symptoms do not include polydipsia, polyuria, blurred vision, numbness and tingling in the toes, increased appetite, weight gain,  infections or foot problems.. baby aspirin   81mg.....denies ...    eye exam 9/24     weight loss of 40  pounds..     feet problems.... hammer toe  right foot  toe     stable and continue meds

## 2025-06-25 NOTE — ASSESSMENT & PLAN NOTE
Patient is also here for follow-up of hypertension.. Symptoms do not include headache confusion visual disturbance dizziness shortness of breath chest pain syncope or palpitations. Recent blood pressure patient has   been checking... 122/70.  With Ozempic and decrease in blood pressure lisinopril was stopped... Due to lightheadedness and patient feels lightheadedness resolved and no longer present.  Remains normotensive... Continue to monitor

## 2025-06-26 ENCOUNTER — APPOINTMENT (OUTPATIENT)
Dept: PRIMARY CARE | Facility: CLINIC | Age: 70
End: 2025-06-26
Payer: MEDICARE

## 2025-07-01 PROCEDURE — RXMED WILLOW AMBULATORY MEDICATION CHARGE

## 2025-07-03 ENCOUNTER — PHARMACY VISIT (OUTPATIENT)
Dept: PHARMACY | Facility: CLINIC | Age: 70
End: 2025-07-03
Payer: COMMERCIAL

## 2025-07-11 DIAGNOSIS — G56.01 ACUTE CARPAL TUNNEL SYNDROME, RIGHT: ICD-10-CM

## 2025-07-11 RX ORDER — MELOXICAM 15 MG/1
15 TABLET ORAL DAILY
Qty: 90 TABLET | Refills: 1 | Status: SHIPPED | OUTPATIENT
Start: 2025-07-11

## 2025-08-06 PROCEDURE — RXMED WILLOW AMBULATORY MEDICATION CHARGE

## 2025-08-07 ENCOUNTER — PHARMACY VISIT (OUTPATIENT)
Dept: PHARMACY | Facility: CLINIC | Age: 70
End: 2025-08-07
Payer: COMMERCIAL

## 2025-08-13 DIAGNOSIS — G25.81 RESTLESS LEGS SYNDROME: ICD-10-CM

## 2025-08-14 RX ORDER — ROPINIROLE 1 MG/1
1 TABLET, FILM COATED ORAL NIGHTLY
Qty: 90 TABLET | Refills: 0 | Status: SHIPPED | OUTPATIENT
Start: 2025-08-14

## 2025-11-26 ENCOUNTER — APPOINTMENT (OUTPATIENT)
Dept: PRIMARY CARE | Facility: CLINIC | Age: 70
End: 2025-11-26
Payer: MEDICARE